# Patient Record
Sex: MALE | Race: WHITE | URBAN - METROPOLITAN AREA
[De-identification: names, ages, dates, MRNs, and addresses within clinical notes are randomized per-mention and may not be internally consistent; named-entity substitution may affect disease eponyms.]

---

## 2020-02-12 ENCOUNTER — EMERGENCY (EMERGENCY)
Facility: HOSPITAL | Age: 60
LOS: 0 days | Discharge: HOME | End: 2020-02-12
Attending: STUDENT IN AN ORGANIZED HEALTH CARE EDUCATION/TRAINING PROGRAM
Payer: COMMERCIAL

## 2020-02-12 VITALS
DIASTOLIC BLOOD PRESSURE: 93 MMHG | RESPIRATION RATE: 17 BRPM | HEIGHT: 75 IN | HEART RATE: 82 BPM | TEMPERATURE: 98 F | OXYGEN SATURATION: 95 % | SYSTOLIC BLOOD PRESSURE: 135 MMHG | WEIGHT: 300.05 LBS

## 2020-02-12 DIAGNOSIS — M25.552 PAIN IN LEFT HIP: ICD-10-CM
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
## 2020-02-12 DIAGNOSIS — M25.512 PAIN IN LEFT SHOULDER: ICD-10-CM

## 2020-02-12 DIAGNOSIS — Z85.46 PERSONAL HISTORY OF MALIGNANT NEOPLASM OF PROSTATE: ICD-10-CM
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
## 2020-02-12 DIAGNOSIS — S46.912A STRAIN OF UNSPECIFIED MUSCLE, FASCIA AND TENDON AT SHOULDER AND UPPER ARM LEVEL, LEFT ARM, INITIAL ENCOUNTER: ICD-10-CM

## 2020-02-12 DIAGNOSIS — Z88.1 ALLERGY STATUS TO OTHER ANTIBIOTIC AGENTS STATUS: ICD-10-CM

## 2020-02-12 PROCEDURE — 99283 EMERGENCY DEPT VISIT LOW MDM: CPT

## 2020-02-12 PROCEDURE — 73502 X-RAY EXAM HIP UNI 2-3 VIEWS: CPT | Mod: LT

## 2020-02-12 PROCEDURE — 73060 X-RAY EXAM OF HUMERUS: CPT | Mod: LT

## 2020-02-12 PROCEDURE — 73030 X-RAY EXAM OF SHOULDER: CPT | Mod: 26,LT

## 2020-02-12 PROCEDURE — 99284 EMERGENCY DEPT VISIT MOD MDM: CPT | Mod: 25

## 2020-02-12 PROCEDURE — 73030 X-RAY EXAM OF SHOULDER: CPT | Mod: LT

## 2020-02-12 PROCEDURE — 73502 X-RAY EXAM HIP UNI 2-3 VIEWS: CPT | Mod: 26,LT

## 2020-02-12 PROCEDURE — 73060 X-RAY EXAM OF HUMERUS: CPT | Mod: 26,LT

## 2020-02-12 RX ORDER — OXYCODONE AND ACETAMINOPHEN 5; 325 MG/1; MG/1
1 TABLET ORAL ONCE
Refills: 0 | Status: DISCONTINUED | OUTPATIENT
Start: 2020-02-12 | End: 2020-02-12

## 2020-02-12 RX ADMIN — OXYCODONE AND ACETAMINOPHEN 1 TABLET(S): 5; 325 TABLET ORAL at 19:08

## 2020-02-12 NOTE — ED ADULT TRIAGE NOTE - CHIEF COMPLAINT QUOTE
SLIPPED DOWN 5 STEPS, NO LOC, NO HEAD TRAUMA. PT HIT LEFT SHOULDER AND LEFT HIP COMPLAINTS OF 10/10 PAIN. AMBULATORY ON SCENE.

## 2020-02-12 NOTE — ED STATDOCS - ATTENDING CONTRIBUTION TO CARE
I, Chika Santana DO,  performed the initial face to face bedside interview with this patient regarding history of present illness, review of symptoms and relevant past medical, social and family history.  I completed an independent physical examination.  I was the initial provider who evaluated this patient. I have signed out the follow up of any pending tests (i.e. labs, radiological studies) to the ACP.  I have communicated the patient’s plan of care and disposition with the ACP.  The history, relevant review of systems, past medical and surgical history, medical decision making, and physical examination was documented by the scribe in my presence and I attest to the accuracy of the documentation.

## 2020-02-12 NOTE — ED STATDOCS - PROGRESS NOTE DETAILS
61 y/o M presents with shoulder pain s/p fall. pt states he slipped and fell forward down 5 stairs injuring his L hip and L shoulder. Pt reports difficulty moving L arm. Denies hitting head, loc, n/v, HA, visual changes, CP, SOB, abd pain or other complaints at this time  Ext: No obvious deformity. TTP L ant shoulder, TTP L hip. Dec ROM LUE secondary to Pain. FROM STEPHEN Teixeira PA-C Xray negative, sling applied, discussed pendulum exercises with pt to prevent frozen shoulder. Will FU with orthopedics. -Armani Teixeira PA-C

## 2020-02-12 NOTE — ED STATDOCS - CARE PROVIDER_API CALL
Daniel Juarez)  Orthopaedic Surgery  [address], [address]  [place] [address]  Phone: [phone]  Fax: [phone]  Follow Up Time: Urgent

## 2020-02-12 NOTE — ED STATDOCS - OBJECTIVE STATEMENT
59 y/o male with a PMHx of Prostate CA, rotator cuff, presents to the ED BIBEMS c/p L shoulder and L hip pain s/p [place]. No LOC. No head injury. Denies cp, sob. No other complaints at this time.

## 2020-02-12 NOTE — ED STATDOCS - MUSCULOSKELETAL, MLM
tenderness L lateral shoulder, restricted ROM secondary to pain, tenderness to L hip but ambulatory in ED with steady gait

## 2020-02-12 NOTE — ED STATDOCS - PATIENT PORTAL LINK FT
You can access the FollowMyHealth Patient Portal offered by [facility] by registering at the following website: [email or web address]. By joining [employer]’s FollowMyHealth portal, you will also be able to view your health information using other applications (apps) compatible with our system.

## 2020-12-18 ENCOUNTER — OUTPATIENT (OUTPATIENT)
Dept: OUTPATIENT SERVICES | Facility: HOSPITAL | Age: 60
LOS: 1 days | End: 2020-12-18
Payer: COMMERCIAL

## 2020-12-18 DIAGNOSIS — Z20.828 CONTACT WITH AND (SUSPECTED) EXPOSURE TO OTHER VIRAL COMMUNICABLE DISEASES: ICD-10-CM

## 2020-12-18 LAB — SARS-COV-2 RNA SPEC QL NAA+PROBE: SIGNIFICANT CHANGE UP

## 2020-12-18 PROCEDURE — U0003: CPT

## 2020-12-19 DIAGNOSIS — Z20.828 CONTACT WITH AND (SUSPECTED) EXPOSURE TO OTHER VIRAL COMMUNICABLE DISEASES: ICD-10-CM

## 2021-03-07 ENCOUNTER — OUTPATIENT (OUTPATIENT)
Dept: OUTPATIENT SERVICES | Facility: HOSPITAL | Age: 61
LOS: 1 days | End: 2021-03-07
Payer: COMMERCIAL

## 2021-03-07 DIAGNOSIS — Z20.828 CONTACT WITH AND (SUSPECTED) EXPOSURE TO OTHER VIRAL COMMUNICABLE DISEASES: ICD-10-CM

## 2021-03-07 LAB — SARS-COV-2 RNA SPEC QL NAA+PROBE: DETECTED

## 2021-03-07 PROCEDURE — U0005: CPT

## 2021-03-07 PROCEDURE — C9803: CPT

## 2021-03-07 PROCEDURE — U0003: CPT

## 2021-03-08 DIAGNOSIS — Z20.828 CONTACT WITH AND (SUSPECTED) EXPOSURE TO OTHER VIRAL COMMUNICABLE DISEASES: ICD-10-CM

## 2021-03-14 ENCOUNTER — OUTPATIENT (OUTPATIENT)
Dept: OUTPATIENT SERVICES | Facility: HOSPITAL | Age: 61
LOS: 1 days | End: 2021-03-14
Payer: COMMERCIAL

## 2021-03-14 DIAGNOSIS — Z20.828 CONTACT WITH AND (SUSPECTED) EXPOSURE TO OTHER VIRAL COMMUNICABLE DISEASES: ICD-10-CM

## 2021-03-14 LAB — SARS-COV-2 RNA SPEC QL NAA+PROBE: DETECTED

## 2021-03-14 PROCEDURE — U0003: CPT

## 2021-03-14 PROCEDURE — U0005: CPT

## 2021-03-14 PROCEDURE — C9803: CPT

## 2021-03-15 DIAGNOSIS — Z20.828 CONTACT WITH AND (SUSPECTED) EXPOSURE TO OTHER VIRAL COMMUNICABLE DISEASES: ICD-10-CM

## 2021-07-02 ENCOUNTER — EMERGENCY (EMERGENCY)
Facility: HOSPITAL | Age: 61
LOS: 0 days | Discharge: HOME | End: 2021-07-03
Attending: FAMILY MEDICINE
Payer: COMMERCIAL

## 2021-07-02 VITALS
SYSTOLIC BLOOD PRESSURE: 96 MMHG | OXYGEN SATURATION: 94 % | WEIGHT: 315 LBS | HEART RATE: 90 BPM | TEMPERATURE: 98 F | HEIGHT: 75 IN | RESPIRATION RATE: 17 BRPM | DIASTOLIC BLOOD PRESSURE: 66 MMHG

## 2021-07-02 DIAGNOSIS — Z85.46 PERSONAL HISTORY OF MALIGNANT NEOPLASM OF PROSTATE: ICD-10-CM

## 2021-07-02 DIAGNOSIS — S01.81XA LACERATION WITHOUT FOREIGN BODY OF OTHER PART OF HEAD, INITIAL ENCOUNTER: ICD-10-CM

## 2021-07-02 DIAGNOSIS — Z88.8 ALLERGY STATUS TO OTHER DRUGS, MEDICAMENTS AND BIOLOGICAL SUBSTANCES: ICD-10-CM

## 2021-07-02 DIAGNOSIS — Z23 ENCOUNTER FOR IMMUNIZATION: ICD-10-CM
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
## 2021-07-02 DIAGNOSIS — Z96.653 PRESENCE OF ARTIFICIAL KNEE JOINT, BILATERAL: Chronic | ICD-10-CM

## 2021-07-02 DIAGNOSIS — S80.211A ABRASION, RIGHT KNEE, INITIAL ENCOUNTER: ICD-10-CM

## 2021-07-02 DIAGNOSIS — S02.2XXA FRACTURE OF NASAL BONES, INITIAL ENCOUNTER FOR CLOSED FRACTURE: ICD-10-CM

## 2021-07-02 DIAGNOSIS — F10.129 ALCOHOL ABUSE WITH INTOXICATION, UNSPECIFIED: ICD-10-CM
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
## 2021-07-02 DIAGNOSIS — M19.90 UNSPECIFIED OSTEOARTHRITIS, UNSPECIFIED SITE: ICD-10-CM

## 2021-07-02 LAB
ALBUMIN SERPL ELPH-MCNC: 4 G/DL — SIGNIFICANT CHANGE UP (ref 3.3–5)
ALP SERPL-CCNC: 69 U/L — SIGNIFICANT CHANGE UP (ref 40–120)
ALT FLD-CCNC: 67 U/L — SIGNIFICANT CHANGE UP (ref 12–78)
ANION GAP SERPL CALC-SCNC: 11 MMOL/L — SIGNIFICANT CHANGE UP (ref 5–17)
AST SERPL-CCNC: 31 U/L — SIGNIFICANT CHANGE UP (ref 15–37)
BASOPHILS # BLD AUTO: 0.12 K/UL — SIGNIFICANT CHANGE UP (ref 0–0.2)
BASOPHILS NFR BLD AUTO: 0.8 % — SIGNIFICANT CHANGE UP (ref 0–2)
BILIRUB SERPL-MCNC: 0.7 MG/DL — SIGNIFICANT CHANGE UP (ref 0.2–1.2)
BUN SERPL-MCNC: 16 MG/DL — SIGNIFICANT CHANGE UP (ref 7–23)
CALCIUM SERPL-MCNC: 8.9 MG/DL — SIGNIFICANT CHANGE UP (ref 8.5–10.1)
CHLORIDE SERPL-SCNC: 105 MMOL/L — SIGNIFICANT CHANGE UP (ref 96–108)
CO2 SERPL-SCNC: 22 MMOL/L — SIGNIFICANT CHANGE UP (ref 22–31)
CREAT SERPL-MCNC: 1.33 MG/DL — HIGH (ref 0.5–1.3)
EOSINOPHIL # BLD AUTO: 0.3 K/UL — SIGNIFICANT CHANGE UP (ref 0–0.5)
EOSINOPHIL NFR BLD AUTO: 2.1 % — SIGNIFICANT CHANGE UP (ref 0–6)
ETHANOL SERPL-MCNC: 36 MG/DL — HIGH (ref 0–10)
GLUCOSE SERPL-MCNC: 136 MG/DL — HIGH (ref 70–99)
HCT VFR BLD CALC: 48.8 % — SIGNIFICANT CHANGE UP (ref 39–50)
HGB BLD-MCNC: 16.3 G/DL — SIGNIFICANT CHANGE UP (ref 13–17)
IMM GRANULOCYTES NFR BLD AUTO: 1 % — SIGNIFICANT CHANGE UP (ref 0–1.5)
LYMPHOCYTES # BLD AUTO: 24.8 % — SIGNIFICANT CHANGE UP (ref 13–44)
LYMPHOCYTES # BLD AUTO: 3.6 K/UL — HIGH (ref 1–3.3)
MCHC RBC-ENTMCNC: 32 PG — SIGNIFICANT CHANGE UP (ref 27–34)
MCHC RBC-ENTMCNC: 33.4 GM/DL — SIGNIFICANT CHANGE UP (ref 32–36)
MCV RBC AUTO: 95.7 FL — SIGNIFICANT CHANGE UP (ref 80–100)
MONOCYTES # BLD AUTO: 0.89 K/UL — SIGNIFICANT CHANGE UP (ref 0–0.9)
MONOCYTES NFR BLD AUTO: 6.1 % — SIGNIFICANT CHANGE UP (ref 2–14)
NEUTROPHILS # BLD AUTO: 9.48 K/UL — HIGH (ref 1.8–7.4)
NEUTROPHILS NFR BLD AUTO: 65.2 % — SIGNIFICANT CHANGE UP (ref 43–77)
PLATELET # BLD AUTO: 252 K/UL — SIGNIFICANT CHANGE UP (ref 150–400)
POTASSIUM SERPL-MCNC: 3.8 MMOL/L — SIGNIFICANT CHANGE UP (ref 3.5–5.3)
POTASSIUM SERPL-SCNC: 3.8 MMOL/L — SIGNIFICANT CHANGE UP (ref 3.5–5.3)
PROT SERPL-MCNC: 7.3 GM/DL — SIGNIFICANT CHANGE UP (ref 6–8.3)
RBC # BLD: 5.1 M/UL — SIGNIFICANT CHANGE UP (ref 4.2–5.8)
RBC # FLD: 13.7 % — SIGNIFICANT CHANGE UP (ref 10.3–14.5)
SODIUM SERPL-SCNC: 138 MMOL/L — SIGNIFICANT CHANGE UP (ref 135–145)
TROPONIN I SERPL-MCNC: <0.015 NG/ML — SIGNIFICANT CHANGE UP (ref 0.01–0.04)
WBC # BLD: 14.54 K/UL — HIGH (ref 3.8–10.5)
WBC # FLD AUTO: 14.54 K/UL — HIGH (ref 3.8–10.5)

## 2021-07-02 PROCEDURE — 72170 X-RAY EXAM OF PELVIS: CPT | Mod: 26

## 2021-07-02 PROCEDURE — 70450 CT HEAD/BRAIN W/O DYE: CPT | Mod: 26

## 2021-07-02 PROCEDURE — 99285 EMERGENCY DEPT VISIT HI MDM: CPT | Mod: 25

## 2021-07-02 PROCEDURE — 96374 THER/PROPH/DIAG INJ IV PUSH: CPT

## 2021-07-02 PROCEDURE — 76376 3D RENDER W/INTRP POSTPROCES: CPT

## 2021-07-02 PROCEDURE — 99285 EMERGENCY DEPT VISIT HI MDM: CPT

## 2021-07-02 PROCEDURE — 80307 DRUG TEST PRSMV CHEM ANLYZR: CPT

## 2021-07-02 PROCEDURE — 72125 CT NECK SPINE W/O DYE: CPT | Mod: 26

## 2021-07-02 PROCEDURE — 72125 CT NECK SPINE W/O DYE: CPT

## 2021-07-02 PROCEDURE — 70486 CT MAXILLOFACIAL W/O DYE: CPT | Mod: 26,MA

## 2021-07-02 PROCEDURE — 73562 X-RAY EXAM OF KNEE 3: CPT | Mod: 26,RT

## 2021-07-02 PROCEDURE — 36415 COLL VENOUS BLD VENIPUNCTURE: CPT

## 2021-07-02 PROCEDURE — 93005 ELECTROCARDIOGRAM TRACING: CPT

## 2021-07-02 PROCEDURE — 93010 ELECTROCARDIOGRAM REPORT: CPT

## 2021-07-02 PROCEDURE — 71045 X-RAY EXAM CHEST 1 VIEW: CPT | Mod: 26

## 2021-07-02 PROCEDURE — 85025 COMPLETE CBC W/AUTO DIFF WBC: CPT

## 2021-07-02 PROCEDURE — 70450 CT HEAD/BRAIN W/O DYE: CPT

## 2021-07-02 PROCEDURE — 71045 X-RAY EXAM CHEST 1 VIEW: CPT

## 2021-07-02 PROCEDURE — 73562 X-RAY EXAM OF KNEE 3: CPT | Mod: RT

## 2021-07-02 PROCEDURE — 84484 ASSAY OF TROPONIN QUANT: CPT

## 2021-07-02 PROCEDURE — 80053 COMPREHEN METABOLIC PANEL: CPT

## 2021-07-02 PROCEDURE — 90715 TDAP VACCINE 7 YRS/> IM: CPT

## 2021-07-02 PROCEDURE — 90471 IMMUNIZATION ADMIN: CPT

## 2021-07-02 PROCEDURE — 82962 GLUCOSE BLOOD TEST: CPT

## 2021-07-02 PROCEDURE — 76376 3D RENDER W/INTRP POSTPROCES: CPT | Mod: 26

## 2021-07-02 PROCEDURE — 70486 CT MAXILLOFACIAL W/O DYE: CPT

## 2021-07-02 PROCEDURE — 72170 X-RAY EXAM OF PELVIS: CPT

## 2021-07-02 RX ORDER — ACETAMINOPHEN 500 MG
1000 TABLET ORAL ONCE
Refills: 0 | Status: COMPLETED | OUTPATIENT
Start: 2021-07-02 | End: 2021-07-02

## 2021-07-02 RX ORDER — TETANUS TOXOID, REDUCED DIPHTHERIA TOXOID AND ACELLULAR PERTUSSIS VACCINE, ADSORBED 5; 2.5; 8; 8; 2.5 [IU]/.5ML; [IU]/.5ML; UG/.5ML; UG/.5ML; UG/.5ML
0.5 SUSPENSION INTRAMUSCULAR ONCE
Refills: 0 | Status: COMPLETED | OUTPATIENT
Start: 2021-07-02 | End: 2021-07-02

## 2021-07-02 RX ORDER — CEFAZOLIN SODIUM 1 G
1000 VIAL (EA) INJECTION ONCE
Refills: 0 | Status: DISCONTINUED | OUTPATIENT
Start: 2021-07-02 | End: 2021-07-02

## 2021-07-02 RX ORDER — CEFAZOLIN SODIUM 1 G
1000 VIAL (EA) INJECTION ONCE
Refills: 0 | Status: COMPLETED | OUTPATIENT
Start: 2021-07-02 | End: 2021-07-02

## 2021-07-02 RX ORDER — SODIUM CHLORIDE 9 MG/ML
1000 INJECTION INTRAMUSCULAR; INTRAVENOUS; SUBCUTANEOUS ONCE
Refills: 0 | Status: COMPLETED | OUTPATIENT
Start: 2021-07-02 | End: 2021-07-02

## 2021-07-02 RX ADMIN — TETANUS TOXOID, REDUCED DIPHTHERIA TOXOID AND ACELLULAR PERTUSSIS VACCINE, ADSORBED 0.5 MILLILITER(S): 5; 2.5; 8; 8; 2.5 SUSPENSION INTRAMUSCULAR at 20:23

## 2021-07-02 RX ADMIN — Medication 1000 MILLIGRAM(S): at 21:48

## 2021-07-02 RX ADMIN — SODIUM CHLORIDE 1000 MILLILITER(S): 9 INJECTION INTRAMUSCULAR; INTRAVENOUS; SUBCUTANEOUS at 18:59

## 2021-07-02 NOTE — ED PROVIDER NOTE - SKIN, MLM
Skin normal color for race, warm, dry and intact. No evidence of rash. Skin warm. No evidence of rash. Multiple abrasions to frontal area with active bleeding. Laceration approximately 1.5 cm to bridge of nose. Avulsion to tip of nose. Abrasion to R knee with tenderness. Able to flex.

## 2021-07-02 NOTE — ED PROVIDER NOTE - CLINICAL SUMMARY MEDICAL DECISION MAKING FREE TEXT BOX
Pt with hx of prostate CA (no treatment) here after trip and fall with facial injury. No LOC. Multiple facial injuries, neck pain. Trauma alert called. CT, plastics consult, labs, update tetanus.

## 2021-07-02 NOTE — ED PROVIDER NOTE - CARE PLAN
Principal Discharge DX:	Nasal fracture  Secondary Diagnosis:	Facial laceration, initial encounter  Secondary Diagnosis:	Fall, initial encounter

## 2021-07-02 NOTE — ED PROVIDER NOTE - ENMT, MLM
Airway patent, Nasal mucosa clear. Mouth with normal mucosa. Throat has no vesicles, no oropharyngeal exudates and uvula is midline. Airway patent, Nasal mucosa clear. Mouth with normal mucosa. Throat has no vesicles, no oropharyngeal exudates and uvula is midline. no septal hematoma palpated.

## 2021-07-02 NOTE — ED PROVIDER NOTE - CARE PROVIDER_API CALL
Shannon Islas)  Plastic Surgery  [address], [address]  [place] [address]  Phone: [phone]  Fax: [phone]  Follow Up Time:     Fredi Rizo)  Otolaryngology  [address] [place] [address]  Phone: [phone]  Fax: [phone]  Follow Up Time:

## 2021-07-02 NOTE — ED PROVIDER NOTE - PROGRESS NOTE DETAILS
Cristopher Field for attending Dr. Mcghee: Pt request plastics. Dr. Roshan tyler. Cristopher Field for attending Dr. Mcghee: Pt request plastics. Dr. Islas informed. Pt and spouse are aware that there may be an additional fee for plastic surgeon.

## 2021-07-02 NOTE — ED ADULT NURSE NOTE - OBJECTIVE STATEMENT
Patient comes in s/p trip and fall with laceration to nose and above left eyebrow as per EMS. Bleeding controlled  Patient endorses drinking 3 alcoholic beverages today. - LOC. TA called at 1940

## 2021-07-02 NOTE — ED ADULT TRIAGE NOTE - CHIEF COMPLAINT QUOTE
Patient comes in s/p trip and fall with laceration to nose and above left eyebrow as per EMS. Bleeding controlled at triage. Patient endorses drinking 3 alcoholic beverages today. - LOC. TA called at 1940

## 2021-07-02 NOTE — ED PROVIDER NOTE - PATIENT PORTAL LINK FT
You can access the FollowMyHealth Patient Portal offered by [facility] by registering at the following website: [email or web address]. By joining [employer]’s FollowMyHealth portal, you will also be able to view your health information using other applications (apps) compatible with our system.

## 2021-07-02 NOTE — ED PROVIDER NOTE - OBJECTIVE STATEMENT
62 y/o male with PMHx of prostate CA, s/p b/l TKRs, OA presents to ED s/p mechanical fall at about 5:45 pm. Pt endorses drinking 3 alcoholic beverages (gin and tonic) today. Pt was walking on the sidewalk, tripped, and fell. +Head injury. C/o left sided neck pain, right knee pain, lacerations to nose, above left eyebrow. Denies LOC, CP, abdominal pain, hip pain. Was not able to get up on his own. Denies cardiac hx. PMD: Dr. Torres. 60 y/o male with PMHx of prostate CA, s/p b/l TKRs, OA presents to ED s/p mechanical fall at about 5:45 pm. Pt endorses drinking 3 alcoholic beverages (gin and tonic) today. Pt was walking on the sidewalk, tripped, and fell. +Head injury. C/o left sided neck pain, right knee pain, lacerations to nose, above left eyebrow. Denies LOC, CP, abdominal pain, hip pain. Was not able to get up on his own. Denies cardiac hx. Non smoker, no illegal drug use. PMD: Dr. Torres. 60 y/o male with PMHx of prostate CA, s/p b/l TKRs, OA presents to ED s/p mechanical fall at about 5:45 pm. Pt endorses drinking 3 alcoholic beverages (gin and tonic) today. Pt was walking on the sidewalk, tripped, and fell. +Head injury. C/o left sided neck pain, right knee pain, lacerations to nose, above left eyebrow. Denies LOC, CP, abdominal pain, hip pain. Was not able to get up on his own. Unsure of last tetanus. Denies cardiac hx. Non smoker, no illegal drug use. PMD: Dr. Torres.

## 2021-07-02 NOTE — ED PROVIDER NOTE - NSFOLLOWUPINSTRUCTIONS_ED_ALL_ED_FT
Keep wounds clean and dry. Take tylenol every four hours for discomfort. After 24 hours may wash gently with soap and water, rinse thoroughly, dry with clean towel, apply bacitracin and dress. Follow up with Dr. Islas as directed. Follow up with ent Dr. Rizo regarding  your nose.

## 2021-07-02 NOTE — ED PROVIDER NOTE - MUSCULOSKELETAL, MLM
Spine appears normal, range of motion is not limited, no muscle or joint tenderness Spine appears normal, range of motion is not limited, no muscle or joint tenderness. HAZEL x4

## 2021-07-03 VITALS
DIASTOLIC BLOOD PRESSURE: 73 MMHG | OXYGEN SATURATION: 94 % | HEART RATE: 95 BPM | RESPIRATION RATE: 18 BRPM | SYSTOLIC BLOOD PRESSURE: 109 MMHG | TEMPERATURE: 98 F

## 2021-07-03 RX ADMIN — Medication 1000 MILLIGRAM(S): at 00:08

## 2022-04-26 ENCOUNTER — EMERGENCY (EMERGENCY)
Facility: HOSPITAL | Age: 62
LOS: 0 days | Discharge: HOME | End: 2022-04-26
Attending: EMERGENCY MEDICINE
Payer: COMMERCIAL

## 2022-04-26 VITALS — HEIGHT: 75 IN

## 2022-04-26 VITALS
RESPIRATION RATE: 18 BRPM | SYSTOLIC BLOOD PRESSURE: 124 MMHG | OXYGEN SATURATION: 98 % | TEMPERATURE: 98 F | DIASTOLIC BLOOD PRESSURE: 68 MMHG | HEART RATE: 97 BPM

## 2022-04-26 DIAGNOSIS — M25.511 PAIN IN RIGHT SHOULDER: ICD-10-CM

## 2022-04-26 DIAGNOSIS — M54.2 CERVICALGIA: ICD-10-CM

## 2022-04-26 DIAGNOSIS — Z88.1 ALLERGY STATUS TO OTHER ANTIBIOTIC AGENTS STATUS: ICD-10-CM

## 2022-04-26 DIAGNOSIS — S09.90XA UNSPECIFIED INJURY OF HEAD, INITIAL ENCOUNTER: ICD-10-CM

## 2022-04-26 DIAGNOSIS — Z92.3 PERSONAL HISTORY OF IRRADIATION: ICD-10-CM

## 2022-04-26 DIAGNOSIS — Z96.653 PRESENCE OF ARTIFICIAL KNEE JOINT, BILATERAL: ICD-10-CM
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
## 2022-04-26 DIAGNOSIS — S52.121A DISPLACED FRACTURE OF HEAD OF RIGHT RADIUS, INITIAL ENCOUNTER FOR CLOSED FRACTURE: ICD-10-CM
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
## 2022-04-26 DIAGNOSIS — Z85.46 PERSONAL HISTORY OF MALIGNANT NEOPLASM OF PROSTATE: ICD-10-CM

## 2022-04-26 DIAGNOSIS — M54.50 LOW BACK PAIN, UNSPECIFIED: ICD-10-CM

## 2022-04-26 DIAGNOSIS — Z20.822 CONTACT WITH AND (SUSPECTED) EXPOSURE TO COVID-19: ICD-10-CM

## 2022-04-26 DIAGNOSIS — M25.521 PAIN IN RIGHT ELBOW: ICD-10-CM

## 2022-04-26 DIAGNOSIS — S46.911A STRAIN OF UNSPECIFIED MUSCLE, FASCIA AND TENDON AT SHOULDER AND UPPER ARM LEVEL, RIGHT ARM, INITIAL ENCOUNTER: ICD-10-CM

## 2022-04-26 DIAGNOSIS — Z96.653 PRESENCE OF ARTIFICIAL KNEE JOINT, BILATERAL: Chronic | ICD-10-CM

## 2022-04-26 DIAGNOSIS — S51.011A LACERATION WITHOUT FOREIGN BODY OF RIGHT ELBOW, INITIAL ENCOUNTER: ICD-10-CM

## 2022-04-26 LAB
ALBUMIN SERPL ELPH-MCNC: 3.4 G/DL — SIGNIFICANT CHANGE UP (ref 3.3–5)
ALP SERPL-CCNC: 96 U/L — SIGNIFICANT CHANGE UP (ref 40–120)
ALT FLD-CCNC: 81 U/L — HIGH (ref 12–78)
ANION GAP SERPL CALC-SCNC: 9 MMOL/L — SIGNIFICANT CHANGE UP (ref 5–17)
AST SERPL-CCNC: 138 U/L — HIGH (ref 15–37)
BASOPHILS # BLD AUTO: 0.09 K/UL — SIGNIFICANT CHANGE UP (ref 0–0.2)
BASOPHILS NFR BLD AUTO: 0.4 % — SIGNIFICANT CHANGE UP (ref 0–2)
BILIRUB SERPL-MCNC: 1.9 MG/DL — HIGH (ref 0.2–1.2)
BUN SERPL-MCNC: 13 MG/DL — SIGNIFICANT CHANGE UP (ref 7–23)
CALCIUM SERPL-MCNC: 9 MG/DL — SIGNIFICANT CHANGE UP (ref 8.5–10.1)
CHLORIDE SERPL-SCNC: 98 MMOL/L — SIGNIFICANT CHANGE UP (ref 96–108)
CO2 SERPL-SCNC: 25 MMOL/L — SIGNIFICANT CHANGE UP (ref 22–31)
CREAT SERPL-MCNC: 0.94 MG/DL — SIGNIFICANT CHANGE UP (ref 0.5–1.3)
EGFR: 91 ML/MIN/1.73M2 — SIGNIFICANT CHANGE UP
EOSINOPHIL # BLD AUTO: 0.17 K/UL — SIGNIFICANT CHANGE UP (ref 0–0.5)
EOSINOPHIL NFR BLD AUTO: 0.7 % — SIGNIFICANT CHANGE UP (ref 0–6)
GLUCOSE SERPL-MCNC: 97 MG/DL — SIGNIFICANT CHANGE UP (ref 70–99)
HCT VFR BLD CALC: 44.3 % — SIGNIFICANT CHANGE UP (ref 39–50)
HGB BLD-MCNC: 15.1 G/DL — SIGNIFICANT CHANGE UP (ref 13–17)
IMM GRANULOCYTES NFR BLD AUTO: 0.9 % — SIGNIFICANT CHANGE UP (ref 0–1.5)
LYMPHOCYTES # BLD AUTO: 0.84 K/UL — LOW (ref 1–3.3)
LYMPHOCYTES # BLD AUTO: 3.7 % — LOW (ref 13–44)
MCHC RBC-ENTMCNC: 32.4 PG — SIGNIFICANT CHANGE UP (ref 27–34)
MCHC RBC-ENTMCNC: 34.1 GM/DL — SIGNIFICANT CHANGE UP (ref 32–36)
MCV RBC AUTO: 95.1 FL — SIGNIFICANT CHANGE UP (ref 80–100)
MONOCYTES # BLD AUTO: 1.57 K/UL — HIGH (ref 0–0.9)
MONOCYTES NFR BLD AUTO: 6.9 % — SIGNIFICANT CHANGE UP (ref 2–14)
NEUTROPHILS # BLD AUTO: 20.02 K/UL — HIGH (ref 1.8–7.4)
NEUTROPHILS NFR BLD AUTO: 87.4 % — HIGH (ref 43–77)
PLATELET # BLD AUTO: 179 K/UL — SIGNIFICANT CHANGE UP (ref 150–400)
POTASSIUM SERPL-MCNC: 3.6 MMOL/L — SIGNIFICANT CHANGE UP (ref 3.5–5.3)
POTASSIUM SERPL-SCNC: 3.6 MMOL/L — SIGNIFICANT CHANGE UP (ref 3.5–5.3)
PROT SERPL-MCNC: 7.9 GM/DL — SIGNIFICANT CHANGE UP (ref 6–8.3)
RBC # BLD: 4.66 M/UL — SIGNIFICANT CHANGE UP (ref 4.2–5.8)
RBC # FLD: 13.9 % — SIGNIFICANT CHANGE UP (ref 10.3–14.5)
SODIUM SERPL-SCNC: 132 MMOL/L — LOW (ref 135–145)
WBC # BLD: 22.89 K/UL — HIGH (ref 3.8–10.5)
WBC # FLD AUTO: 22.89 K/UL — HIGH (ref 3.8–10.5)

## 2022-04-26 PROCEDURE — 70450 CT HEAD/BRAIN W/O DYE: CPT | Mod: MA

## 2022-04-26 PROCEDURE — 24650 CLTX RDL HEAD/NCK FX WO MNPJ: CPT | Mod: 54

## 2022-04-26 PROCEDURE — 73080 X-RAY EXAM OF ELBOW: CPT | Mod: RT

## 2022-04-26 PROCEDURE — U0005: CPT

## 2022-04-26 PROCEDURE — 85025 COMPLETE CBC W/AUTO DIFF WBC: CPT

## 2022-04-26 PROCEDURE — 36415 COLL VENOUS BLD VENIPUNCTURE: CPT

## 2022-04-26 PROCEDURE — 99285 EMERGENCY DEPT VISIT HI MDM: CPT | Mod: 57

## 2022-04-26 PROCEDURE — 70450 CT HEAD/BRAIN W/O DYE: CPT | Mod: 26,MA

## 2022-04-26 PROCEDURE — U0003: CPT

## 2022-04-26 PROCEDURE — 96374 THER/PROPH/DIAG INJ IV PUSH: CPT | Mod: XU

## 2022-04-26 PROCEDURE — 72125 CT NECK SPINE W/O DYE: CPT | Mod: MA

## 2022-04-26 PROCEDURE — 73030 X-RAY EXAM OF SHOULDER: CPT | Mod: RT

## 2022-04-26 PROCEDURE — 72100 X-RAY EXAM L-S SPINE 2/3 VWS: CPT

## 2022-04-26 PROCEDURE — 73030 X-RAY EXAM OF SHOULDER: CPT | Mod: 26,RT

## 2022-04-26 PROCEDURE — 72100 X-RAY EXAM L-S SPINE 2/3 VWS: CPT | Mod: 26

## 2022-04-26 PROCEDURE — 80053 COMPREHEN METABOLIC PANEL: CPT

## 2022-04-26 PROCEDURE — 96375 TX/PRO/DX INJ NEW DRUG ADDON: CPT | Mod: XU

## 2022-04-26 PROCEDURE — 73521 X-RAY EXAM HIPS BI 2 VIEWS: CPT

## 2022-04-26 PROCEDURE — 29105 APPLICATION LONG ARM SPLINT: CPT | Mod: RT

## 2022-04-26 PROCEDURE — 72125 CT NECK SPINE W/O DYE: CPT | Mod: 26,MA

## 2022-04-26 PROCEDURE — 73521 X-RAY EXAM HIPS BI 2 VIEWS: CPT | Mod: 26

## 2022-04-26 PROCEDURE — 99285 EMERGENCY DEPT VISIT HI MDM: CPT | Mod: 25

## 2022-04-26 PROCEDURE — 73080 X-RAY EXAM OF ELBOW: CPT | Mod: 26,RT

## 2022-04-26 RX ORDER — SODIUM CHLORIDE 9 MG/ML
1000 INJECTION INTRAMUSCULAR; INTRAVENOUS; SUBCUTANEOUS ONCE
Refills: 0 | Status: COMPLETED | OUTPATIENT
Start: 2022-04-26 | End: 2022-04-26

## 2022-04-26 RX ORDER — KETOROLAC TROMETHAMINE 30 MG/ML
15 SYRINGE (ML) INJECTION ONCE
Refills: 0 | Status: DISCONTINUED | OUTPATIENT
Start: 2022-04-26 | End: 2022-04-26

## 2022-04-26 RX ORDER — MORPHINE SULFATE 50 MG/1
6 CAPSULE, EXTENDED RELEASE ORAL ONCE
Refills: 0 | Status: DISCONTINUED | OUTPATIENT
Start: 2022-04-26 | End: 2022-04-26

## 2022-04-26 RX ADMIN — MORPHINE SULFATE 6 MILLIGRAM(S): 50 CAPSULE, EXTENDED RELEASE ORAL at 20:57

## 2022-04-26 RX ADMIN — SODIUM CHLORIDE 1000 MILLILITER(S): 9 INJECTION INTRAMUSCULAR; INTRAVENOUS; SUBCUTANEOUS at 23:05

## 2022-04-26 RX ADMIN — Medication 15 MILLIGRAM(S): at 20:57

## 2022-04-26 NOTE — ED ADULT NURSE NOTE - NSIMPLEMENTINTERV_GEN_ALL_ED
Implemented All Fall Risk Interventions:  Blair to call system. Call bell, personal items and telephone within reach. Instruct patient to call for assistance. Room bathroom lighting operational. Non-slip footwear when patient is off stretcher. Physically safe environment: no spills, clutter or unnecessary equipment. Stretcher in lowest position, wheels locked, appropriate side rails in place. Provide visual cue, wrist band, yellow gown, etc. Monitor gait and stability. Monitor for mental status changes and reorient to person, place, and time. Review medications for side effects contributing to fall risk. Reinforce activity limits and safety measures with patient and family.

## 2022-04-26 NOTE — ED ADULT TRIAGE NOTE - CHIEF COMPLAINT QUOTE
Pt presents to er with complaints of falling down 7 steps at 13:00 today, states his back, back of his head and laceration to right elbow, denies loc, use of blood thinners at this time, denies chest pain, sob, states he has a very mild headache at this time, complains of chronic bilateral hip pain at this time, unknown if utd with tetanus shot Pt presents to er with complaints of falling down 7 steps at 13:00 today, states he hit his back, back of his head and laceration to right elbow, pain to right shoulder/arm denies loc, use of blood thinners at this time, denies chest pain, sob, states he has a headache at this time, complains of chronic bilateral hip pain at this time, unknown if utd with tetanus shot.  TA called at 1818

## 2022-04-26 NOTE — ED PROVIDER NOTE - CARE PROVIDER_API CALL
Boston Allen)  Orthopaedic Surgery; Surgery of the Hand  [address] [place] [address]  Phone: [phone]  Fax: [phone]  Follow Up Time:

## 2022-04-26 NOTE — ED PROVIDER NOTE - NSFOLLOWUPINSTRUCTIONS_ED_ALL_ED_FT
Follow up with Dr Gee tomorrow  Alternatively you can follow up with Dr Allen for your elbow and shoulder.  plenty of rest  plenty of fluids  continue your medications as prescribed.  keep splint clean and dry, maintain at all times, do not remove until you see orthopedics  Anything worsens or persists, return to ER for further care and evaluation.      Fracture    A fracture is a break in one of your bones. This can occur from a variety of injuries, especially traumatic ones. Symptoms include pain, bruising, or swelling. Do not use the injured limb. If a fracture is in one of the bones below your waist, do not put weight on that limb unless instructed to do so by your healthcare provider. Crutches or a cane may have been provided. A splint or cast may have been applied by your health care provider. Make sure to keep it dry and follow up with an orthopedist as instructed.    SEEK IMMEDIATE MEDICAL CARE IF YOU HAVE ANY OF THE FOLLOWING SYMPTOMS: numbness, tingling, increasing pain, or weakness in any part of the injured limb.

## 2022-04-26 NOTE — ED PROVIDER NOTE - PATIENT PORTAL LINK FT
You can access the FollowMyHealth Patient Portal offered by [facility] by registering at the following website: [email or web address]. By joining [employer]’s FollowMyHealth portal, you will also be able to view your health information using other applications (apps) compatible with our system.

## 2022-04-26 NOTE — ED PROVIDER NOTE - CARE PLAN
1 Principal Discharge DX:	Head injury  Secondary Diagnosis:	Radial head fracture, closed  Secondary Diagnosis:	Right shoulder strain  Secondary Diagnosis:	Fall

## 2022-04-26 NOTE — ED PROVIDER NOTE - OBJECTIVE STATEMENT
63 y/o male w/ a PMHx of prostate CA presents to the ED s/p fall down 7 steps today at 1 pm. Pt reports he was walking up a flight of stairs when he fell backwards down 7 stairs on to a landing and hit his right elbow on the railing,  denies LOC. Pt c/o right shoulder/arm pain, chronic b/l hip pain, and neck pain on arriaval. Laceration noted to right elbow. Denies weakness, CP or SOB. Pt denies being on blood thinners. +trauma alert called in triage. Pt denies feeling ill in any other way. No other complaints at this time.

## 2022-04-26 NOTE — ED ADULT TRIAGE NOTE - BRAND OF FIRST COVID-19 BOOSTER
Physical Therapy  CVICU Treatment    Visit Type: treatment  Precautions:  Medical precautions:  sternal;. Multivessel coronary artery disease S/P CABG x 3  Lines:     Basic: continuous pulse oximetry, telemetry, O2, wound vac and IV (3L/min O2)      Lines in chart and on patient reviewed, cautions maintained throughout session.  Safety Measures: chair alarm      SUBJECTIVE                                                                                                            Patient agreed to participate in therapy this date.  Patient complains of being tired, otherwise doing OK. Needs to use the commode after walking.   No reports of pain during session       OBJECTIVE                                                                                                                Oriented to person, place, time and situation     Arousal alertness: appropriate responses to stimuli  Patient activity tolerance: 1 to 1 activity to rest  Bed Mobility:    Training completed:      Education details: patient safety    Discussed home sleeping arrangements, patient plans to alternate between sofa and recliner. Discussed sternal precautions  Transfers:    Assistive devices: gait belt and 1 person    Sit to stand: minimal assist    Stand to sit: minimal assist    Stand pivot: minimal assist  Gait/Ambulation:     Assistance: supervision   Assistive device: wheelchair, 1 person and gait belt    Distance (ft): 400    Pattern: within functional limits    Deviation in foot placement: wide base of support  Training Completed:    Tasks: gait training on level surfaces    Education details: patient safety    2 brief standing rest breaks due to ARANA, on 3L/min O2. SpO2 stable.         Interventions                                                                                                       Skilled input: Verbal instruction/cues  Verbal Consent: Writer verbally educated and received verbal consent for hand placement,  pts blood sugar this morning was 448.  They asked how long she is under our care from receiving letter. Advised 30 days. So we have her under our care until 7/4/19.  They are wondering since her blood sugar was so high this morning if they should give her more insulin. They did give her the scheduled insulin of novolog 20 units. Or should they just check her as scheduled at noon?    I can call Mckayla back at [phone]   positioning of patient, and techniques to be performed today from patient for clothing adjustments for techniques, therapist position for techniques and hand placement and palpation for techniques as described above and how they are pertinent to the patient's plan of care.  Additional Intervention Details: Education on deep breathing and splinted cough, patient able to mobilize and expel secretions x 1. Extra time for toileting during session.         ASSESSMENT                                                                                                                Impairments: activity tolerance, endurance, shortness of breath and body habitus  Functional Limitations: all functional mobility  Progressing well. Overall supervision to min assist for mobility, on 3L/min O2 for activity. Will benefit from continued skilled PT to address deficits.        Discharge Recommendations  Recommendation for Discharge: PT WI: Home, Home therapy         PT/OT Mobility Equipment for Discharge: continue to assess, owns 4WW and cane   PT/OT ADL Equipment for Discharge: has shower chair, grab bars - continue to assess  PT Identified Barriers to Discharge: medical     Skilled therapy is required to address these limitations in attempt to maximize the patient's independence.  Progress: improving as expected    End of Session:   Location: in bathroom  Handoff to: nurse            PLAN                                                                                                                            Suggestions for next session as indicated: Review bed mobility sequencing, transfers, progress gait to no AD    Frequency Comments: M-F      Agreement to plan and goals: patient agrees with goals and treatment plan        GOALS:  Review Date: 10/26/2020  Long Term Goals: (to be met by time of discharge from hospital)  State precautions: Patient able to state precautions independent.  Status: progressing/ongoing  Maintain  precautions: Patient able to maintain precautions independent.  Status: progressing/ongoing  Rolling left: Patient will complete bed mobility for rolling left modified independent.  Status: progressing/ongoing  Rolling right: Patient will complete bed mobility for rolling right modified independent.  Status: progressing/ongoing  Sit to supine: Patient will complete sit to supine modified independent.  Status: progressing/ongoing  Supine to sit: Patient will complete supine to sit modified independent.  Status: progressing/ongoing  Sit to stand: Patient will complete sit to stand transfer with modified independent.  Status: progressing/ongoing  Stand to sit: Patient will complete stand to sit transfer with modified independent.  Status: progressing/ongoing  Stand pivot: Patient will complete stand pivot transfer with modified independent.  Status: progressing/ongoing  Ambulation (even): Patient will ambulate on even surface for 150 feet with modified independent.  Status: progressing/ongoing  3-4 step device: to enter home.  Flight of stairs: Patient will ambulate a flight of stairs with modified independent. Status: not met    Documented in the chart in the following areas: Assessment.         [employer]

## 2022-04-26 NOTE — ED PROVIDER NOTE - PROGRESS NOTE DETAILS
Cristopher Matt for attending Dr. Fernandez:  Pt now c/o b/l shoulder and lower back pain. Pt states now he has chronic back and hip for which he take Oxycodone PO at home. Will order labs, further XRs, and pain meds. XRs sig for small radial head fracture.  splinted.  elbow lac superficial except for sub-cm part.  given must splint, lac steri-stripped so there are not sutures under a cast.  pt ambulating.  ok for dc home with ortho followup.

## 2022-04-27 PROBLEM — C61 MALIGNANT NEOPLASM OF PROSTATE: Chronic | Status: ACTIVE | Noted: 2021-07-09

## 2022-04-27 LAB — SARS-COV-2 RNA SPEC QL NAA+PROBE: SIGNIFICANT CHANGE UP

## 2022-04-30 ENCOUNTER — INPATIENT (INPATIENT)
Facility: HOSPITAL | Age: 62
LOS: 4 days | Discharge: HOME HEALTH | DRG: 871 | End: 2022-05-05
Attending: FAMILY MEDICINE | Admitting: FAMILY MEDICINE
Payer: COMMERCIAL

## 2022-04-30 VITALS — HEIGHT: 75 IN | WEIGHT: 291.89 LBS

## 2022-04-30 DIAGNOSIS — Z96.653 PRESENCE OF ARTIFICIAL KNEE JOINT, BILATERAL: Chronic | ICD-10-CM

## 2022-04-30 DIAGNOSIS — R09.89 OTHER SPECIFIED SYMPTOMS AND SIGNS INVOLVING THE CIRCULATORY AND RESPIRATORY SYSTEMS: ICD-10-CM

## 2022-04-30 DIAGNOSIS — J18.9 PNEUMONIA, UNSPECIFIED ORGANISM: ICD-10-CM

## 2022-04-30 LAB
ADD ON TEST-SPECIMEN IN LAB: SIGNIFICANT CHANGE UP
ALBUMIN SERPL ELPH-MCNC: 2.6 G/DL — LOW (ref 3.3–5)
ALP SERPL-CCNC: 94 U/L — SIGNIFICANT CHANGE UP (ref 40–120)
ALT FLD-CCNC: 84 U/L — HIGH (ref 12–78)
ANION GAP SERPL CALC-SCNC: 9 MMOL/L — SIGNIFICANT CHANGE UP (ref 5–17)
APTT BLD: 23.3 SEC — LOW (ref 27.5–35.5)
AST SERPL-CCNC: 56 U/L — HIGH (ref 15–37)
BASOPHILS # BLD AUTO: 0 K/UL — SIGNIFICANT CHANGE UP (ref 0–0.2)
BASOPHILS NFR BLD AUTO: 0 % — SIGNIFICANT CHANGE UP (ref 0–2)
BILIRUB SERPL-MCNC: 0.9 MG/DL — SIGNIFICANT CHANGE UP (ref 0.2–1.2)
BUN SERPL-MCNC: 18 MG/DL — SIGNIFICANT CHANGE UP (ref 7–23)
CALCIUM SERPL-MCNC: 8.2 MG/DL — LOW (ref 8.5–10.1)
CHLORIDE SERPL-SCNC: 98 MMOL/L — SIGNIFICANT CHANGE UP (ref 96–108)
CO2 SERPL-SCNC: 27 MMOL/L — SIGNIFICANT CHANGE UP (ref 22–31)
CREAT SERPL-MCNC: 0.97 MG/DL — SIGNIFICANT CHANGE UP (ref 0.5–1.3)
EGFR: 88 ML/MIN/1.73M2 — SIGNIFICANT CHANGE UP
EOSINOPHIL # BLD AUTO: 0.41 K/UL — SIGNIFICANT CHANGE UP (ref 0–0.5)
EOSINOPHIL NFR BLD AUTO: 2 % — SIGNIFICANT CHANGE UP (ref 0–6)
GLUCOSE SERPL-MCNC: 96 MG/DL — SIGNIFICANT CHANGE UP (ref 70–99)
HCT VFR BLD CALC: 44.3 % — SIGNIFICANT CHANGE UP (ref 39–50)
HGB BLD-MCNC: 14.8 G/DL — SIGNIFICANT CHANGE UP (ref 13–17)
INR BLD: 1.21 RATIO — HIGH (ref 0.88–1.16)
LACTATE SERPL-SCNC: 1.4 MMOL/L — SIGNIFICANT CHANGE UP (ref 0.7–2)
LYMPHOCYTES # BLD AUTO: 15 % — SIGNIFICANT CHANGE UP (ref 13–44)
LYMPHOCYTES # BLD AUTO: 3.05 K/UL — SIGNIFICANT CHANGE UP (ref 1–3.3)
MAGNESIUM SERPL-MCNC: 2.6 MG/DL — SIGNIFICANT CHANGE UP (ref 1.6–2.6)
MCHC RBC-ENTMCNC: 32 PG — SIGNIFICANT CHANGE UP (ref 27–34)
MCHC RBC-ENTMCNC: 33.4 GM/DL — SIGNIFICANT CHANGE UP (ref 32–36)
MCV RBC AUTO: 95.7 FL — SIGNIFICANT CHANGE UP (ref 80–100)
MONOCYTES # BLD AUTO: 1.63 K/UL — HIGH (ref 0–0.9)
MONOCYTES NFR BLD AUTO: 8 % — SIGNIFICANT CHANGE UP (ref 2–14)
NEUTROPHILS # BLD AUTO: 15.26 K/UL — HIGH (ref 1.8–7.4)
NEUTROPHILS NFR BLD AUTO: 74 % — SIGNIFICANT CHANGE UP (ref 43–77)
NRBC # BLD: SIGNIFICANT CHANGE UP /100 WBCS (ref 0–0)
NT-PROBNP SERPL-SCNC: 122 PG/ML — SIGNIFICANT CHANGE UP (ref 0–125)
PLATELET # BLD AUTO: SIGNIFICANT CHANGE UP K/UL (ref 150–400)
POTASSIUM SERPL-MCNC: 3.5 MMOL/L — SIGNIFICANT CHANGE UP (ref 3.5–5.3)
POTASSIUM SERPL-SCNC: 3.5 MMOL/L — SIGNIFICANT CHANGE UP (ref 3.5–5.3)
PROT SERPL-MCNC: 8 GM/DL — SIGNIFICANT CHANGE UP (ref 6–8.3)
PROTHROM AB SERPL-ACNC: 14.1 SEC — HIGH (ref 10.5–13.4)
RAPID RVP RESULT: SIGNIFICANT CHANGE UP
RBC # BLD: 4.63 M/UL — SIGNIFICANT CHANGE UP (ref 4.2–5.8)
RBC # FLD: 13.9 % — SIGNIFICANT CHANGE UP (ref 10.3–14.5)
SARS-COV-2 RNA SPEC QL NAA+PROBE: SIGNIFICANT CHANGE UP
SODIUM SERPL-SCNC: 134 MMOL/L — LOW (ref 135–145)
TROPONIN I, HIGH SENSITIVITY RESULT: 3.1 NG/L — SIGNIFICANT CHANGE UP
TROPONIN I, HIGH SENSITIVITY RESULT: <3 NG/L — SIGNIFICANT CHANGE UP
WBC # BLD: 20.35 K/UL — HIGH (ref 3.8–10.5)
WBC # FLD AUTO: 20.35 K/UL — HIGH (ref 3.8–10.5)

## 2022-04-30 PROCEDURE — 80053 COMPREHEN METABOLIC PANEL: CPT

## 2022-04-30 PROCEDURE — 88172 CYTP DX EVAL FNA 1ST EA SITE: CPT

## 2022-04-30 PROCEDURE — 71045 X-RAY EXAM CHEST 1 VIEW: CPT | Mod: 26

## 2022-04-30 PROCEDURE — 71275 CT ANGIOGRAPHY CHEST: CPT | Mod: 26

## 2022-04-30 PROCEDURE — 93010 ELECTROCARDIOGRAM REPORT: CPT

## 2022-04-30 PROCEDURE — 74177 CT ABD & PELVIS W/CONTRAST: CPT

## 2022-04-30 PROCEDURE — 38505 NEEDLE BIOPSY LYMPH NODES: CPT

## 2022-04-30 PROCEDURE — 93970 EXTREMITY STUDY: CPT

## 2022-04-30 PROCEDURE — 85027 COMPLETE CBC AUTOMATED: CPT

## 2022-04-30 PROCEDURE — 86803 HEPATITIS C AB TEST: CPT

## 2022-04-30 PROCEDURE — 85730 THROMBOPLASTIN TIME PARTIAL: CPT

## 2022-04-30 PROCEDURE — 71275 CT ANGIOGRAPHY CHEST: CPT | Mod: MD

## 2022-04-30 PROCEDURE — 94761 N-INVAS EAR/PLS OXIMETRY MLT: CPT

## 2022-04-30 PROCEDURE — 36415 COLL VENOUS BLD VENIPUNCTURE: CPT

## 2022-04-30 PROCEDURE — 85049 AUTOMATED PLATELET COUNT: CPT

## 2022-04-30 PROCEDURE — 76942 ECHO GUIDE FOR BIOPSY: CPT

## 2022-04-30 PROCEDURE — G0103: CPT

## 2022-04-30 PROCEDURE — 99285 EMERGENCY DEPT VISIT HI MDM: CPT

## 2022-04-30 PROCEDURE — 99223 1ST HOSP IP/OBS HIGH 75: CPT | Mod: GC

## 2022-04-30 PROCEDURE — 93306 TTE W/DOPPLER COMPLETE: CPT

## 2022-04-30 PROCEDURE — 88307 TISSUE EXAM BY PATHOLOGIST: CPT

## 2022-04-30 PROCEDURE — 84484 ASSAY OF TROPONIN QUANT: CPT

## 2022-04-30 PROCEDURE — 82378 CARCINOEMBRYONIC ANTIGEN: CPT

## 2022-04-30 PROCEDURE — 82306 VITAMIN D 25 HYDROXY: CPT

## 2022-04-30 PROCEDURE — 81001 URINALYSIS AUTO W/SCOPE: CPT

## 2022-04-30 RX ORDER — HEPARIN SODIUM 5000 [USP'U]/ML
INJECTION INTRAVENOUS; SUBCUTANEOUS
Qty: 25000 | Refills: 0 | Status: DISCONTINUED | OUTPATIENT
Start: 2022-04-30 | End: 2022-05-01

## 2022-04-30 RX ORDER — HEPARIN SODIUM 5000 [USP'U]/ML
5000 INJECTION INTRAVENOUS; SUBCUTANEOUS EVERY 6 HOURS
Refills: 0 | Status: DISCONTINUED | OUTPATIENT
Start: 2022-04-30 | End: 2022-05-01

## 2022-04-30 RX ORDER — CEFTRIAXONE 500 MG/1
1000 INJECTION, POWDER, FOR SOLUTION INTRAMUSCULAR; INTRAVENOUS EVERY 24 HOURS
Refills: 0 | Status: COMPLETED | OUTPATIENT
Start: 2022-04-30 | End: 2022-05-04

## 2022-04-30 RX ORDER — CEFTRIAXONE 500 MG/1
1000 INJECTION, POWDER, FOR SOLUTION INTRAMUSCULAR; INTRAVENOUS ONCE
Refills: 0 | Status: COMPLETED | OUTPATIENT
Start: 2022-04-30 | End: 2022-04-30

## 2022-04-30 RX ORDER — HEPARIN SODIUM 5000 [USP'U]/ML
10000 INJECTION INTRAVENOUS; SUBCUTANEOUS EVERY 6 HOURS
Refills: 0 | Status: DISCONTINUED | OUTPATIENT
Start: 2022-04-30 | End: 2022-05-01

## 2022-04-30 RX ORDER — AZITHROMYCIN 500 MG/1
500 TABLET, FILM COATED ORAL ONCE
Refills: 0 | Status: COMPLETED | OUTPATIENT
Start: 2022-04-30 | End: 2022-04-30

## 2022-04-30 RX ORDER — HEPARIN SODIUM 5000 [USP'U]/ML
5000 INJECTION INTRAVENOUS; SUBCUTANEOUS EVERY 8 HOURS
Refills: 0 | Status: DISCONTINUED | OUTPATIENT
Start: 2022-04-30 | End: 2022-05-01

## 2022-04-30 RX ORDER — HEPARIN SODIUM 5000 [USP'U]/ML
10000 INJECTION INTRAVENOUS; SUBCUTANEOUS ONCE
Refills: 0 | Status: COMPLETED | OUTPATIENT
Start: 2022-04-30 | End: 2022-04-30

## 2022-04-30 RX ORDER — AZITHROMYCIN 500 MG/1
500 TABLET, FILM COATED ORAL DAILY
Refills: 0 | Status: DISCONTINUED | OUTPATIENT
Start: 2022-04-30 | End: 2022-05-05

## 2022-04-30 RX ADMIN — HEPARIN SODIUM 10000 UNIT(S): 5000 INJECTION INTRAVENOUS; SUBCUTANEOUS at 22:55

## 2022-04-30 RX ADMIN — HEPARIN SODIUM 2400 UNIT(S)/HR: 5000 INJECTION INTRAVENOUS; SUBCUTANEOUS at 22:54

## 2022-04-30 RX ADMIN — AZITHROMYCIN 255 MILLIGRAM(S): 500 TABLET, FILM COATED ORAL at 19:25

## 2022-04-30 RX ADMIN — CEFTRIAXONE 100 MILLIGRAM(S): 500 INJECTION, POWDER, FOR SOLUTION INTRAMUSCULAR; INTRAVENOUS at 19:20

## 2022-04-30 NOTE — H&P ADULT - NSHPPHYSICALEXAM_GEN_ALL_CORE
Vital Signs Last 24 Hrs  T(C): 36.8 (30 Apr 2022 15:02), Max: 36.9 (30 Apr 2022 12:00)  T(F): 98.2 (30 Apr 2022 15:02), Max: 98.4 (30 Apr 2022 12:00)  HR: 84 (30 Apr 2022 15:02) (84 - 94)  BP: 121/75 (30 Apr 2022 15:02) (117/77 - 121/75)  BP(mean): 89 (30 Apr 2022 15:02) (88 - 89)  RR: 20 (30 Apr 2022 15:02) (20 - 20)  SpO2: 98% (30 Apr 2022 15:02) (88% - 98%)

## 2022-04-30 NOTE — H&P ADULT - NSHPSOCIALHISTORY_GEN_ALL_CORE
Former smoker, smoke for 20 years and quit 20 years ago  Social ETOH (3 drinks per week)  No illicit drugs Pt lives with his wife.  He is a melgoza.  Former smoker, smoke for 20 years and quit 20 years ago  Social ETOH (3 drinks per week)  No illicit drugs

## 2022-04-30 NOTE — H&P ADULT - NEUROLOGICAL
Joint Dislocation Reduction
Alert & oriented; no sensory, motor or coordination deficits, normal reflexes

## 2022-04-30 NOTE — H&P ADULT - NSHPREVIEWOFSYSTEMS_GEN_ALL_CORE
REVIEW OF SYSTEMS:    CONSTITUTIONAL: +weakness, +fevers and chills  EYES/ENT: No visual changes;  No vertigo or throat pain   NECK: No pain or stiffness  RESPIRATORY: +productive cough, No wheezing, No hemoptysis; +shortness of breath  CARDIOVASCULAR: +chest discomfort, No palpitations  GASTROINTESTINAL: No abdominal or epigastric pain. No nausea, vomiting, or hematemesis; No diarrhea or constipation. No melena or hematochezia.  GENITOURINARY: No dysuria, frequency or hematuria  NEUROLOGICAL: No numbness or weakness  SKIN: No itching, rashes

## 2022-04-30 NOTE — ED ADULT NURSE REASSESSMENT NOTE - NS ED NURSE REASSESS COMMENT FT1
Received pt from TOMAS Oquendo, pt ordered for heparin gtt, MD Harrell bedside explaining plan of care to pt and wife, pt assigned bed on 1N

## 2022-04-30 NOTE — H&P ADULT - NSHPOUTPATIENTPROVIDERS_GEN_ALL_CORE
Pain management Dr. Campbell PCP Dr. Cathi Torres,  [facility]  Oncology [facility]  Pain management Dr. Campbell

## 2022-04-30 NOTE — H&P ADULT - ATTENDING COMMENTS
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 ,  and post-obstructive Pna.    Discussed results with pt and his wife in the ED.     - discussed CTA results with CT Surgery Dr. Faria and Oncology Tracy Weems and will arrange for further evaluation and work up  - heparin drip  - cont Antiobiotics  - dupplex LE  - echo  - consult CT surg, Oncology, Pulmonary  - Full Code

## 2022-04-30 NOTE — H&P ADULT - HISTORY OF PRESENT ILLNESS
61yo male with PMHx of prostate cancer, b/l knee replacement here complaining of SOB and chest discomfort that started on the weekend.   Patient mentions his chest discomfrot is non radiating but it is associated with WILEY with minimal movements and SOB all the time.  Patient mentions when he started he also had 100.5 fever and has been having productive cough at the beginning was green but for the past couple of days has been clear.  He has never experienced something similar.    Patient was recently seen in the ED on 4/26 after losing his balance and falling backwards.  He did hit his head at that time, but did not lose consciousness.  Patient denies any dizziness, lightheadedness, headache, nausea, vomiting.      In the ED patient was found to have WBC 20.35, D-dimer 2456, Lactate 1.4, he was givne Azithromycin 500mg x1 and Rocephine 1000mg x1 63yo male with PMHx of prostate cancer, b/l knee replacement here complaining of SOB and chest pain that started on the weekend.   Patient mentions his chest pain is non radiating but it is associated with WILEY with minimal movements and SOB all the time. If he tries to take deep breaths he gets chest pain and worsen SOB.  Patient mentions when he started he also had 100.5 fever and has been having productive cough at the beginning was green but for the past couple of days has been clear.  He has never experienced something similar.    Patient was recently seen in the ED on 4/26 after losing his balance and falling backwards.  He did hit his head at that time, but did not lose consciousness.  Patient denies any dizziness, lightheadedness, headache, nausea, vomiting.    He was treated for Prostate cancer 7 years ago, has not had any recent follow up.    b/l knee replacement on 2004 and 2007.    In the ED patient was found to have WBC 20.35, D-dimer 2456, Lactate 1.4, he was givne Azithromycin 500mg x1 and Rocephine 1000mg x1 61yo male with PMHx of prostate cancer, b/l knee replacement here complaining of SOB and chest pain that started on the weekend.   Patient mentions his chest pain is non radiating but it is associated with WILEY with minimal movements and SOB all the time. If he tries to take deep breaths he gets chest pain and worsen SOB.  Patient mentions when he started he also had 100.5 fever and has been having productive cough at the beginning was green but for the past couple of days has been clear.  He has never experienced something similar.    Patient was recently seen in the ED on 4/26 after losing his balance and falling backwards.  He did hit his head at that time, but did not lose consciousness.  Patient denies any dizziness, lightheadedness, headache, nausea, vomiting.   On further questioning,  pt admitted to loss of appetite over the last eight days.  He mentioned WILEY ongoing for more than six months.   He was treated for Prostate cancer 7 years ago, has not had any recent follow up.    b/l knee replacement on 2004 and 2007.    In the ED patient was found to have WBC 20.35, D-dimer 2456, Lactate 1.4, he was givne Azithromycin 500mg x1 and Rocephine 1000mg x1

## 2022-04-30 NOTE — H&P ADULT - ASSESSMENT
61yo male with PMHx of prostate cancer, b/l knee replacement here complaining of SOB and chest discomfort that started on the weekend.     63yo male with PMHx of prostate cancer, b/l knee replacement here complaining of SOB and chest discomfort that started on the weekend.    Admit to tele     #AHRF  - Possibly 2/2 CAP + likely PE  - D-dimer 2456, HR 90s, SpO2 88% on OLGA,   - Continue O2 NC currently on 4 L, titrate PRN.  goal SpO2 >92%  - f/u PSA Hx of prostate Ca 7 yrs ago, no recent follow up  - Pro-BNP unremarkable. Low suspicion for RV strain. Holding off on TTE to check RV strain  - Monitor vitals closely  - CXR: Pending official read  - Wells score: 4.5.  Moderate risk group: 16.2% chance of PE in an ED population  - f/u stat CTA chest, if PE is confirm will need to start heparin drip  - f/u AM labs    #Chest pain  - Likely pleuritic, exacerbates with deep breaths  - Trops neg x1, will repeat in 6hr  - f/u CTA results to r/o PE  - EKG: Sinus tach  - Monitor vital signs  - f/u AM labs    #Chronic pain  - Patient mentions he takes Hydrocodone as outpatient, does not remember the dose  - Currently not having knee or shoulder pain.  - Will try to get the correct dose tomorrow    #DVT ppx  - Improve VTE score: 1  - SCDs  - Will start Heparin Subq  - f/u CTA results and start AC if needed    #Code status  - Full code    *Case discussed with Dr. Harrell     63yo male with PMHx of prostate cancer, b/l knee replacement here complaining of SOB and chest discomfort that started on the weekend.    Admit to tele     #AHRF 2/2 CAP  - Based on clinical presentation and Physical exam there is an increase probability of a PE  - D-dimer 2456, HR 90s, SpO2 88% on OLGA,   - Continue O2 NC currently on 4 L, titrate PRN.  goal SpO2 >92%  - f/u PSA Hx of prostate Ca 7 yrs ago, no recent follow up  - Pro-BNP unremarkable. Low suspicion for RV strain. Holding off on TTE to check RV strain  - Continue Azithromycin 500mg qd  - Continue Rocephin 1000mg qd  - Monitor vitals closely  - CXR: Pending official read  - Wells score: 4.5.  Moderate risk group: 16.2% chance of PE in an ED population  - f/u stat CTA chest, if PE is confirm will need to start heparin drip  - f/u AM labs    #Chest pain  - Likely pleuritic, exacerbates with deep breaths  - Trops neg x1, will repeat in 6hr  - f/u CTA results to r/o PE  - EKG: Sinus tach  - Monitor vital signs  - f/u AM labs    #Chronic pain  - Patient mentions he takes Hydrocodone as outpatient, does not remember the dose  - Currently not having knee or shoulder pain.  - Will try to get the correct dose tomorrow    #DVT ppx  - Improve VTE score: 1  - SCDs  - Will start Heparin Subq  - f/u CTA results and start AC if needed    #Code status  - Full code    *Case discussed with Dr. Harrell

## 2022-04-30 NOTE — ED ADULT NURSE NOTE - CHIEF COMPLAINT QUOTE
pt c/o CP and SOB beginning last sunday 4/23. pt was seen in [facility] on tuesday (4/26) for a fall. pt had x2 negative covid test. hx- bilat knee replacements, prostate ca.

## 2022-04-30 NOTE — H&P ADULT - NSICDXFAMILYHX_GEN_ALL_CORE_FT
FAMILY HISTORY:  Father  Still living? No  Family history of lung cancer, Age at diagnosis: 71-80    Mother  Still living? No  Family history of thyroid cancer, Age at diagnosis: 71-80

## 2022-04-30 NOTE — ED ADULT TRIAGE NOTE - CHIEF COMPLAINT QUOTE
pt c/o CP and SOB beginning last sunday 4/23. pt was seen in [facility] on tuesday (4/26) for a fall. pt had x2 negative covid test. hx- bilat knee replacements, prostate ca.

## 2022-04-30 NOTE — ED PROVIDER NOTE - OBJECTIVE STATEMENT
62 M hx prostate cancer, b/l knee replacements here complaining of  slight non radiating non exertional chest pain and shortness of breath for the past 1 week. pt also reports having a cough and had green phlegm but states as the week went on the phlegm became more clear. reports having a low grade fever, 100.5.  pt states he tried to see his PMD, Dr. Torres, but due to his cough they could not see him. pt also states that on Tuesday, 4/26 he reports he had a fall backwards and fell down approx 6 steps with + head injury and was able to get up on his own afterwards and was evaluated here afterwards at [facility] ED.  pt came to the ED today due to difficulty breathing, cough, and chest pain. former smoker, quit 20 years ago and states that he smoked for 20 years. reports he drinks 3 ETOH drinks per week.

## 2022-04-30 NOTE — PROVIDER CONTACT NOTE (OTHER) - SITUATION
DR. KRYSTLE MADRID AWARE/SPOKE WITH DR. PIKE.
Spoke to service line aware of consult
Spoke to service line aware of consult.

## 2022-04-30 NOTE — ED PROVIDER NOTE - CLINICAL SUMMARY MEDICAL DECISION MAKING FREE TEXT BOX
pt with a hx of chronic pain here with complaint of shortness of breath, cough, and chest pain for 1 week. pt also had a fall after chest pain and shortness of breath started. pt hypoxic with rales on the right base. r/o pneumonia. r/o PE

## 2022-04-30 NOTE — ED ADULT NURSE NOTE - OBJECTIVE STATEMENT
Patient states he fell the other day and was in the ED for lac repair, past 3 days patient states he has been having "some shortness of breath " and "left sided CP". Patient is alert and oriented , color good.

## 2022-05-01 LAB
APTT BLD: 49.7 SEC — HIGH (ref 27.5–35.5)
APTT BLD: 71.8 SEC — HIGH (ref 27.5–35.5)
HCT VFR BLD CALC: 40 % — SIGNIFICANT CHANGE UP (ref 39–50)
HCV AB S/CO SERPL IA: 0.26 S/CO — SIGNIFICANT CHANGE UP (ref 0–0.99)
HCV AB SERPL-IMP: SIGNIFICANT CHANGE UP
HGB BLD-MCNC: 13.3 G/DL — SIGNIFICANT CHANGE UP (ref 13–17)
MCHC RBC-ENTMCNC: 31.8 PG — SIGNIFICANT CHANGE UP (ref 27–34)
MCHC RBC-ENTMCNC: 33.3 GM/DL — SIGNIFICANT CHANGE UP (ref 32–36)
MCV RBC AUTO: 95.7 FL — SIGNIFICANT CHANGE UP (ref 80–100)
PLATELET # BLD AUTO: 140 K/UL — LOW (ref 150–400)
PSA FLD-MCNC: <0.01 NG/ML — SIGNIFICANT CHANGE UP (ref 0–4)
RBC # BLD: 4.18 M/UL — LOW (ref 4.2–5.8)
RBC # FLD: 14 % — SIGNIFICANT CHANGE UP (ref 10.3–14.5)
WBC # BLD: 18.37 K/UL — HIGH (ref 3.8–10.5)
WBC # FLD AUTO: 18.37 K/UL — HIGH (ref 3.8–10.5)

## 2022-05-01 PROCEDURE — 99232 SBSQ HOSP IP/OBS MODERATE 35: CPT

## 2022-05-01 PROCEDURE — 93970 EXTREMITY STUDY: CPT | Mod: 26

## 2022-05-01 RX ORDER — ENOXAPARIN SODIUM 100 MG/ML
40 INJECTION SUBCUTANEOUS EVERY 12 HOURS
Refills: 0 | Status: DISCONTINUED | OUTPATIENT
Start: 2022-05-01 | End: 2022-05-05

## 2022-05-01 RX ADMIN — HEPARIN SODIUM 2700 UNIT(S)/HR: 5000 INJECTION INTRAVENOUS; SUBCUTANEOUS at 08:27

## 2022-05-01 RX ADMIN — HEPARIN SODIUM 2700 UNIT(S)/HR: 5000 INJECTION INTRAVENOUS; SUBCUTANEOUS at 12:46

## 2022-05-01 RX ADMIN — HEPARIN SODIUM 5000 UNIT(S): 5000 INJECTION INTRAVENOUS; SUBCUTANEOUS at 05:38

## 2022-05-01 RX ADMIN — AZITHROMYCIN 500 MILLIGRAM(S): 500 TABLET, FILM COATED ORAL at 09:51

## 2022-05-01 RX ADMIN — HEPARIN SODIUM 2700 UNIT(S)/HR: 5000 INJECTION INTRAVENOUS; SUBCUTANEOUS at 05:33

## 2022-05-01 RX ADMIN — CEFTRIAXONE 100 MILLIGRAM(S): 500 INJECTION, POWDER, FOR SOLUTION INTRAMUSCULAR; INTRAVENOUS at 20:59

## 2022-05-01 RX ADMIN — HEPARIN SODIUM 2400 UNIT(S)/HR: 5000 INJECTION INTRAVENOUS; SUBCUTANEOUS at 05:30

## 2022-05-01 RX ADMIN — HEPARIN SODIUM 2700 UNIT(S)/HR: 5000 INJECTION INTRAVENOUS; SUBCUTANEOUS at 07:22

## 2022-05-01 RX ADMIN — ENOXAPARIN SODIUM 40 MILLIGRAM(S): 100 INJECTION SUBCUTANEOUS at 22:43

## 2022-05-01 NOTE — PATIENT PROFILE ADULT - FALL HARM RISK - HARM RISK INTERVENTIONS

## 2022-05-01 NOTE — CONSULT NOTE ADULT - ASSESSMENT
63yo male with PMHx of prostate cancer, b/l knee replacement here complaining of SOB and chest pain found to have 6 cm lung mass.     FULL CONSULT TO FOLLOW  61yo male with PMHx of prostate cancer, b/l knee replacement here complaining of SOB and chest pain now found to have a 6x6.1x3.8 right hilar mass with enlarged mediastinal, hilar LN and tumor embolus w/in smaller branches in RUL.     # lung mass  - CT chest- A likely malignant right hilar mass is partially encasing the central right pulmonary arteries a tumoral embolus within the smaller branches in the right upper lobe not entirely excluded. An enlarged mediastinal, hilar lymph nodes, suspect malignant Again, an ill-defined spiculated right hilar mass measures at least a 6.0 x6.1 x 3.8 cm. An additional patchy opacities, consolidations in the lower lobes bilaterally and the left middle lobe, not excluding superimposed pneumonia and atelectasis. A pulmonary nodularity, a dominant nodule in the right lung base measures 2.0 cm.  - pulm consulted  - will need EBUS with mediastinal and hilar LN sampling for staging purposes  - need tissue diagnosis  - would recommend CT a/p with contrast for metastatic workup while inpatient   - CTH w/o contrast but negative- due to mediastinal LN concern and advanced staging, would recommend MRI brain with contrast   - will need PET scan outpatient =    # tumor thrombus  - Definitive therapy for pulmonary tumor embolism is directed at treating the primary tumor. 2019 study showed that there is no significant difference in survival between patients with tumour thrombus treated with or without anticoagulation.   - pts typically do not require anticoagulation unless another indication  - NEGATIVE LE US doppler - no DVT   - no history of blood clot in past   - currently on hep gtt      Dr. Hong Molina  cell: [phone]  Weekends and nights please call [phone] for MD on call  [facility]  Hematology/Oncology       61yo male with PMHx of prostate cancer, b/l knee replacement here complaining of SOB and chest pain now found to have a 6x6.1x3.8 right hilar mass with enlarged mediastinal, hilar LN and tumor embolus w/in smaller branches in RUL.     # lung mass  - CT chest- A likely malignant right hilar mass is partially encasing the central right pulmonary arteries a tumoral embolus within the smaller branches in the right upper lobe not entirely excluded. An enlarged mediastinal, hilar lymph nodes, suspect malignant Again, an ill-defined spiculated right hilar mass measures at least a 6.0 x6.1 x 3.8 cm. An additional patchy opacities, consolidations in the lower lobes bilaterally and the left middle lobe, not excluding superimposed pneumonia and atelectasis. A pulmonary nodularity, a dominant nodule in the right lung base measures 2.0 cm.  - pulm consulted  - may need EBUS with mediastinal and hilar LN sampling for staging purposes- will discuss with pulm  - other option inludes tissue diagnosis by biopsy of supraclavicular node by IR   - would recommend CT a/p with contrast for metastatic workup while inpatient   - CTH w/o contrast but negative- due to mediastinal LN concern and advanced staging, will need MRI brain with contrast   - will need PET scan outpatient    # tumor thrombus  - Definitive therapy for pulmonary tumor embolism is directed at treating the primary tumor. 2019 study showed that there is no significant difference in survival between patients with tumour thrombus treated with or without anticoagulation.   - pts typically do not require anticoagulation unless another indication  - NEGATIVE LE US doppler - no DVT   - no history of blood clot in past   - DISCONTINUE hep gtt      Dr. Hong Molina  cell: [phone]  Weekends and nights please call [phone] for MD on call  [facility]  Hematology/Oncology

## 2022-05-01 NOTE — PROGRESS NOTE ADULT - SUBJECTIVE AND OBJECTIVE BOX
61yo male with PMHx of prostate cancer, b/l knee replacement here complaining of SOB and chest pain that started on the weekend.   Patient mentions his chest pain is non radiating but it is associated with WILEY with minimal movements and SOB all the time. If he tries to take deep breaths he gets chest pain and worsen SOB.  Patient mentions when he started he also had 100.5 fever and has been having productive cough at the beginning was green but for the past couple of days has been clear.  He has never experienced something similar.    Patient was recently seen in the ED on 4/26 after losing his balance and falling backwards.  He did hit his head at that time, but did not lose consciousness.  Patient denies any dizziness, lightheadedness, headache, nausea, vomiting.   On further questioning,  pt admitted to loss of appetite over the last eight days.  He mentioned WILEY ongoing for more than six months.   He was treated for Prostate cancer 7 years ago, has not had any recent follow up.    b/l knee replacement on 2004 and 2007.    In the ED patient was found to have WBC 20.35, D-dimer 2456, Lactate 1.4, he was givne Azithromycin 500mg x1 and Rocephine 1000mg x1    Subjective - Pt was seen by his bedside, in mild resp distress. Pt is complaining of mild cough.     REVIEW OF SYSTEMS:    CONSTITUTIONAL: +weakness, +fevers and chills  EYES/ENT: No visual changes;  No vertigo or throat pain   NECK: No pain or stiffness  RESPIRATORY: +productive cough, No wheezing, No hemoptysis; +shortness of breath  CARDIOVASCULAR: +chest discomfort, No palpitations  GASTROINTESTINAL: No abdominal or epigastric pain. No nausea, vomiting, or hematemesis; No diarrhea or constipation. No melena or hematochezia.  GENITOURINARY: No dysuria, frequency or hematuria  NEUROLOGICAL: No numbness or weakness  SKIN: No itching, rashes    Examination-    GENERAL: NAD, lying in bed comfortably  HEAD:  Atraumatic, Normocephalic  EYES: EOMI, PERRLA, conjunctiva and sclera clear  ENT: Moist mucous membranes  NECK: Supple, No JVD  CHEST/LUNG: Clear to auscultation bilaterally; No rales, rhonchi, wheezing, or rubs. Unlabored respirations  HEART: Regular rate and rhythm; No murmurs, rubs, or gallops  ABDOMEN: Bowel sounds present; Soft, Nontender, Nondistended. No hepatomegally  EXTREMITIES:  2+ Peripheral Pulses, brisk capillary refill. No clubbing, cyanosis, or edema  NERVOUS SYSTEM:  Alert & Oriented X3, speech clear. No deficits   MSK: FROM all 4 extremities, full and equal strength  SKIN: No rashes or lesions    Vital Signs Last 24 Hrs  T(C): 36.2 (01 May 2022 15:12), Max: 36.9 (01 May 2022 07:30)  T(F): 97.2 (01 May 2022 15:12), Max: 98.4 (01 May 2022 07:30)  HR: 83 (01 May 2022 15:12) (79 - 91)  BP: 145/76 (01 May 2022 15:12) (127/62 - 145/76)  BP(mean): 98 (30 Apr 2022 22:21) (98 - 98)  RR: 18 (01 May 2022 15:12) (18 - 20)  SpO2: 96% (01 May 2022 15:12) (92% - 96%)     Labs  -               13.3   18.37 )-----------( 140      ( 01 May 2022 04:43 )             40.0     04-30    134<L>  |  98  |  18  ----------------------------<  96  3.5   |  27  |  0.97    Ca    8.2<L>      30 Apr 2022 14:42  Mg     2.6     04-30    TPro  8.0  /  Alb  2.6<L>  /  TBili  0.9  /  DBili  x   /  AST  56<H>  /  ALT  84<H>  /  AlkPhos  94  04-30    MEDICATIONS  (STANDING):  azithromycin   Tablet 500 milliGRAM(s) Oral daily  cefTRIAXone   IVPB 1000 milliGRAM(s) IV Intermittent every 24 hours  enoxaparin Injectable 40 milliGRAM(s) SubCutaneous every 12 hours    MEDICATIONS  (PRN):    < from: CT Angio Chest PE Protocol w/ IV Cont (04.30.22 @ 18:43) >  IMPRESSION: No pulmonary arterial emboli.    1.7 cm right lower lobe pulmonary nodule associated with right hilar and   mediastinal lymphadenopathy worrisome for primary lung neoplasm with   metastatic disease.    Multiple right supraclavicular and retroclavicular lymph nodes may also   represent metastatic disease as described above. These lymph nodes are   amenable to percutaneous ultrasound-guided biopsy.    Nonspecific 5 mm right upper lobe pulmonary nodule.    A few mildly enlarged portacaval lymph nodes are nonspecific.    PET scan can be performed for complete evaluation.    --- End of Report ---    < end of copied text >          cc - dyspnea, cough   61yo male with PMHx of prostate cancer, b/l knee replacement here complaining of SOB and chest pain that started on the weekend.   Patient mentions his chest pain is non radiating but it is associated with WILEY with minimal movements and SOB all the time. If he tries to take deep breaths he gets chest pain and worsen SOB.  Patient mentions when he started he also had 100.5 fever and has been having productive cough at the beginning was green but for the past couple of days has been clear.  He has never experienced something similar.    Patient was recently seen in the ED on 4/26 after losing his balance and falling backwards.  He did hit his head at that time, but did not lose consciousness.  Patient denies any dizziness, lightheadedness, headache, nausea, vomiting.   On further questioning,  pt admitted to loss of appetite over the last eight days.  He mentioned WILEY ongoing for more than six months.   He was treated for Prostate cancer 7 years ago, has not had any recent follow up.    b/l knee replacement on 2004 and 2007.    In the ED patient was found to have WBC 20.35, D-dimer 2456, Lactate 1.4, he was givne Azithromycin 500mg x1 and Rocephine 1000mg x1    Subjective - Pt was seen by his bedside, in mild resp distress. Pt is complaining of mild cough.     REVIEW OF SYSTEMS:    CONSTITUTIONAL: +weakness, +fevers and chills  EYES/ENT: No visual changes;  No vertigo or throat pain   NECK: No pain or stiffness  RESPIRATORY: +productive cough, No wheezing, No hemoptysis; +shortness of breath  CARDIOVASCULAR: +chest discomfort, No palpitations  GASTROINTESTINAL: No abdominal or epigastric pain. No nausea, vomiting, or hematemesis; No diarrhea or constipation. No melena or hematochezia.  GENITOURINARY: No dysuria, frequency or hematuria  NEUROLOGICAL: No numbness or weakness  SKIN: No itching, rashes    Examination-    GENERAL: NAD, lying in bed comfortably  HEAD:  Atraumatic, Normocephalic  EYES: EOMI, PERRLA, conjunctiva and sclera clear  ENT: Moist mucous membranes  NECK: Supple, No JVD  CHEST/LUNG: Clear to auscultation bilaterally; No rales, rhonchi, wheezing, or rubs. Unlabored respirations  HEART: Regular rate and rhythm; No murmurs, rubs, or gallops  ABDOMEN: Bowel sounds present; Soft, Nontender, Nondistended. No hepatomegally  EXTREMITIES:  2+ Peripheral Pulses, brisk capillary refill. No clubbing, cyanosis, or edema  NERVOUS SYSTEM:  Alert & Oriented X3, speech clear. No deficits   MSK: FROM all 4 extremities, full and equal strength  SKIN: No rashes or lesions    Vital Signs Last 24 Hrs  T(C): 36.2 (01 May 2022 15:12), Max: 36.9 (01 May 2022 07:30)  T(F): 97.2 (01 May 2022 15:12), Max: 98.4 (01 May 2022 07:30)  HR: 83 (01 May 2022 15:12) (79 - 91)  BP: 145/76 (01 May 2022 15:12) (127/62 - 145/76)  BP(mean): 98 (30 Apr 2022 22:21) (98 - 98)  RR: 18 (01 May 2022 15:12) (18 - 20)  SpO2: 96% (01 May 2022 15:12) (92% - 96%)     Labs  -  all reviewed              13.3   18.37 )-----------( 140      ( 01 May 2022 04:43 )             40.0     04-30    134<L>  |  98  |  18  ----------------------------<  96  3.5   |  27  |  0.97    Ca    8.2<L>      30 Apr 2022 14:42  Mg     2.6     04-30    TPro  8.0  /  Alb  2.6<L>  /  TBili  0.9  /  DBili  x   /  AST  56<H>  /  ALT  84<H>  /  AlkPhos  94  04-30    MEDICATIONS  (STANDING):  azithromycin   Tablet 500 milliGRAM(s) Oral daily  cefTRIAXone   IVPB 1000 milliGRAM(s) IV Intermittent every 24 hours  enoxaparin Injectable 40 milliGRAM(s) SubCutaneous every 12 hours    MEDICATIONS  (PRN):    < from: CT Angio Chest PE Protocol w/ IV Cont (04.30.22 @ 18:43) >  IMPRESSION: No pulmonary arterial emboli.    1.7 cm right lower lobe pulmonary nodule associated with right hilar and   mediastinal lymphadenopathy worrisome for primary lung neoplasm with   metastatic disease.    Multiple right supraclavicular and retroclavicular lymph nodes may also   represent metastatic disease as described above. These lymph nodes are   amenable to percutaneous ultrasound-guided biopsy.    Nonspecific 5 mm right upper lobe pulmonary nodule.    A few mildly enlarged portacaval lymph nodes are nonspecific.    PET scan can be performed for complete evaluation.    --- End of Report ---    < end of copied text >

## 2022-05-01 NOTE — CONSULT NOTE ADULT - SUBJECTIVE AND OBJECTIVE BOX
HPI:  63yo male with PMHx of prostate cancer, b/l knee replacement here complaining of SOB and chest pain that started on the weekend.   Patient mentions his chest pain is non radiating but it is associated with WILEY with minimal movements and SOB all the time. If he tries to take deep breaths he gets chest pain and worsen SOB.  Patient mentions when he started he also had 100.5 fever and has been having productive cough at the beginning was green but for the past couple of days has been clear.  He has never experienced something similar.    Patient was recently seen in the ED on  after losing his balance and falling backwards.  He did hit his head at that time, but did not lose consciousness.  Patient denies any dizziness, lightheadedness, headache, nausea, vomiting.   On further questioning,  pt admitted to loss of appetite over the last eight days.  He mentioned WILEY ongoing for more than six months.   He was treated for Prostate cancer 7 years ago, has not had any recent follow up.    b/l knee replacement on  and .    In the ED patient was found to have WBC 20.35, D-dimer 2456, Lactate 1.4, he was givne Azithromycin 500mg x1 and Rocephine 1000mg x1 (2022 18:10)      PAST MEDICAL & SURGICAL HISTORY:  Prostate CA    History of total bilateral knee replacement (TKR)        Allergies    tetracyclines (Unknown)    Intolerances        MEDICATIONS  (STANDING):  azithromycin   Tablet 500 milliGRAM(s) Oral daily  cefTRIAXone   IVPB 1000 milliGRAM(s) IV Intermittent every 24 hours  heparin   Injectable 5000 Unit(s) SubCutaneous every 8 hours  heparin  Infusion.  Unit(s)/Hr (24 mL/Hr) IV Continuous <Continuous>    MEDICATIONS  (PRN):  heparin   Injectable 98852 Unit(s) IV Push every 6 hours PRN For aPTT less than 40  heparin   Injectable 5000 Unit(s) IV Push every 6 hours PRN For aPTT between 40 - 57      FAMILY HISTORY:  Family history of thyroid cancer (Mother)    Family history of lung cancer (Father)  had mesothelioma,   in his 90s        SOCIAL HISTORY: No EtOH, no tobacco    REVIEW OF SYSTEMS:    CONSTITUTIONAL: No weakness, fevers or chills  EYES/ENT: No visual changes;  No vertigo or throat pain   NECK: No pain or stiffness  RESPIRATORY: No cough, wheezing, hemoptysis; No shortness of breath  CARDIOVASCULAR: No chest pain or palpitations  GASTROINTESTINAL: No abdominal or epigastric pain. No nausea, vomiting, or hematemesis; No diarrhea or constipation. No melena or hematochezia.  GENITOURINARY: No dysuria, frequency or hematuria  NEUROLOGICAL: No numbness or weakness  SKIN: No itching, burning, rashes, or lesions   All other review of systems is negative unless indicated above.    Height (cm): 190.5 ( @ 11:49)  Weight (kg): 131.7 ( @ 22:06)  BMI (kg/m2): 36.3 ( @ 22:06)  BSA (m2): 2.57 ( @ 22:06)    T(F): 98.4 (22 @ 07:30), Max: 98.4 (22 @ 12:00)  HR: 79 (22 @ 07:30)  BP: 132/66 (22 @ 07:30)  RR: 20 (22 @ 07:30)  SpO2: 94% (22 @ 07:30)  Wt(kg): --    GENERAL: NAD, well-developed  HEAD:  Atraumatic, Normocephalic  EYES: EOMI, PERRLA, conjunctiva and sclera clear  NECK: Supple, No JVD  CHEST/LUNG: Clear to auscultation bilaterally; No wheeze  HEART: Regular rate and rhythm; No murmurs, rubs, or gallops  ABDOMEN: Soft, Nontender, Nondistended; Bowel sounds present  EXTREMITIES:  2+ Peripheral Pulses, No clubbing, cyanosis, or edema  NEUROLOGY: non-focal  SKIN: No rashes or lesions                          13.3   18.37 )-----------( 140      ( 01 May 2022 04:43 )             40.0           134<L>  |  98  |  18  ----------------------------<  96  3.5   |  27  |  0.97    Ca    8.2<L>      2022 14:42  Mg     2.6         TPro  8.0  /  Alb  2.6<L>  /  TBili  0.9  /  DBili  x   /  AST  56<H>  /  ALT  84<H>  /  AlkPhos  94        Magnesium, Serum: 2.6 mg/dL ( @ 14:42)      PT/INR - ( 2022 14:42 )   PT: 14.1 sec;   INR: 1.21 ratio         PTT - ( 01 May 2022 04:43 )  PTT:49.7 sec     HPI:  63 yo male with PMHx of prostate cancer, b/l knee replacement here complaining of SOB and chest pain now found to have a 6x6.1x3.8 right hilar mass with enlarged mediastinal, hilar LN and tumor embolus w/in smaller branches in RUL.     atient mentions his chest pain is non radiating but it is associated with WILEY with minimal movements and SOB all the time. If he tries to take deep breaths he gets chest pain and worsen SOB.  Patient mentions when he started he also had 100.5 fever and has been having productive cough at the beginning was green but for the past couple of days has been clear.  He has never experienced something similar.    Patient was recently seen in the ED on  after losing his balance and falling backwards.  He did hit his head at that time, but did not lose consciousness.  Patient denies any dizziness, lightheadedness, headache, nausea, vomiting.   On further questioning,  pt admitted to loss of appetite over the last eight days.  He mentioned WILEY ongoing for more than six months.   He was treated for Prostate cancer 7 years ago, has not had any recent follow up.    b/l knee replacement on  and .    In the ED patient was found to have WBC 20.35, D-dimer 2456, Lactate 1.4, he was givne Azithromycin 500mg x1 and Rocephine 1000mg x1     CT A likely malignant right hilar mass is partially encasing the central right pulmonary arteries a tumoral embolus within the smaller branches in the right upper lobe not entirely excluded. An enlarged mediastinal, hilar lymph nodes, suspect malignant Again, an ill-defined spiculated right hilar mass measures at least a 6.0 x6.1 x 3.8 cm. An additional patchy opacities, consolidations in the lower lobes bilaterally and the left middle lobe, not excluding superimposed pneumonia and atelectasis.  A pulmonary nodularity, a dominant nodule in the right lung base measures 2.0 cm.      PAST MEDICAL & SURGICAL HISTORY:  Prostate CA    History of total bilateral knee replacement (TKR)        Allergies    tetracyclines (Unknown)    Intolerances        MEDICATIONS  (STANDING):  azithromycin   Tablet 500 milliGRAM(s) Oral daily  cefTRIAXone   IVPB 1000 milliGRAM(s) IV Intermittent every 24 hours  heparin   Injectable 5000 Unit(s) SubCutaneous every 8 hours  heparin  Infusion.  Unit(s)/Hr (24 mL/Hr) IV Continuous <Continuous>    MEDICATIONS  (PRN):  heparin   Injectable 72077 Unit(s) IV Push every 6 hours PRN For aPTT less than 40  heparin   Injectable 5000 Unit(s) IV Push every 6 hours PRN For aPTT between 40 - 57      FAMILY HISTORY:  Family history of thyroid cancer (Mother)    Family history of lung cancer (Father)  had mesothelioma,   in his 90s        SOCIAL HISTORY: No EtOH, no tobacco    REVIEW OF SYSTEMS:    CONSTITUTIONAL: No weakness, fevers or chills  EYES/ENT: No visual changes;  No vertigo or throat pain   NECK: No pain or stiffness  RESPIRATORY: No cough, wheezing, hemoptysis; No shortness of breath  CARDIOVASCULAR: No chest pain or palpitations  GASTROINTESTINAL: No abdominal or epigastric pain. No nausea, vomiting, or hematemesis; No diarrhea or constipation. No melena or hematochezia.  GENITOURINARY: No dysuria, frequency or hematuria  NEUROLOGICAL: No numbness or weakness  SKIN: No itching, burning, rashes, or lesions   All other review of systems is negative unless indicated above.    Height (cm): 190.5 ( @ 11:49)  Weight (kg): 131.7 ( @ 22:06)  BMI (kg/m2): 36.3 ( @ 22:06)  BSA (m2): 2.57 ( @ 22:06)    T(F): 98.4 (22 @ 07:30), Max: 98.4 (22 @ 12:00)  HR: 79 (22 @ 07:30)  BP: 132/66 (22 @ 07:30)  RR: 20 (22 @ 07:30)  SpO2: 94% (22 @ 07:30)  Wt(kg): --    GENERAL: NAD, well-developed  HEAD:  Atraumatic, Normocephalic  EYES: EOMI, PERRLA, conjunctiva and sclera clear  NECK: Supple, No JVD  CHEST/LUNG: Clear to auscultation bilaterally; No wheeze  HEART: Regular rate and rhythm; No murmurs, rubs, or gallops  ABDOMEN: Soft, Nontender, Nondistended; Bowel sounds present  EXTREMITIES:  2+ Peripheral Pulses, No clubbing, cyanosis, or edema  NEUROLOGY: non-focal  SKIN: No rashes or lesions                          13.3   18.37 )-----------( 140      ( 01 May 2022 04:43 )             40.0           134<L>  |  98  |  18  ----------------------------<  96  3.5   |  27  |  0.97    Ca    8.2<L>      2022 14:42  Mg     2.6         TPro  8.0  /  Alb  2.6<L>  /  TBili  0.9  /  DBili  x   /  AST  56<H>  /  ALT  84<H>  /  AlkPhos  94        Magnesium, Serum: 2.6 mg/dL ( @ 14:42)      PT/INR - ( 2022 14:42 )   PT: 14.1 sec;   INR: 1.21 ratio         PTT - ( 01 May 2022 04:43 )  PTT:49.7 sec

## 2022-05-02 LAB
24R-OH-CALCIDIOL SERPL-MCNC: 17.8 NG/ML — LOW (ref 30–80)
ALBUMIN SERPL ELPH-MCNC: 2.6 G/DL — LOW (ref 3.3–5)
ALP SERPL-CCNC: 74 U/L — SIGNIFICANT CHANGE UP (ref 40–120)
ALT FLD-CCNC: 73 U/L — SIGNIFICANT CHANGE UP (ref 12–78)
ANION GAP SERPL CALC-SCNC: 8 MMOL/L — SIGNIFICANT CHANGE UP (ref 5–17)
AST SERPL-CCNC: 46 U/L — HIGH (ref 15–37)
BILIRUB SERPL-MCNC: 0.6 MG/DL — SIGNIFICANT CHANGE UP (ref 0.2–1.2)
BUN SERPL-MCNC: 13 MG/DL — SIGNIFICANT CHANGE UP (ref 7–23)
CALCIUM SERPL-MCNC: 8.6 MG/DL — SIGNIFICANT CHANGE UP (ref 8.5–10.1)
CEA SERPL-MCNC: 0.9 NG/ML — SIGNIFICANT CHANGE UP (ref 0–3.8)
CHLORIDE SERPL-SCNC: 102 MMOL/L — SIGNIFICANT CHANGE UP (ref 96–108)
CO2 SERPL-SCNC: 28 MMOL/L — SIGNIFICANT CHANGE UP (ref 22–31)
CREAT SERPL-MCNC: 0.92 MG/DL — SIGNIFICANT CHANGE UP (ref 0.5–1.3)
EGFR: 93 ML/MIN/1.73M2 — SIGNIFICANT CHANGE UP
GLUCOSE SERPL-MCNC: 109 MG/DL — HIGH (ref 70–99)
HCT VFR BLD CALC: 44.8 % — SIGNIFICANT CHANGE UP (ref 39–50)
HGB BLD-MCNC: 14.4 G/DL — SIGNIFICANT CHANGE UP (ref 13–17)
MCHC RBC-ENTMCNC: 31.3 PG — SIGNIFICANT CHANGE UP (ref 27–34)
MCHC RBC-ENTMCNC: 32.1 GM/DL — SIGNIFICANT CHANGE UP (ref 32–36)
MCV RBC AUTO: 97.4 FL — SIGNIFICANT CHANGE UP (ref 80–100)
PLATELET # BLD AUTO: SIGNIFICANT CHANGE UP (ref 150–400)
POTASSIUM SERPL-MCNC: 3.9 MMOL/L — SIGNIFICANT CHANGE UP (ref 3.5–5.3)
POTASSIUM SERPL-SCNC: 3.9 MMOL/L — SIGNIFICANT CHANGE UP (ref 3.5–5.3)
PROT SERPL-MCNC: 8.2 GM/DL — SIGNIFICANT CHANGE UP (ref 6–8.3)
RBC # BLD: 4.6 M/UL — SIGNIFICANT CHANGE UP (ref 4.2–5.8)
RBC # FLD: 14.4 % — SIGNIFICANT CHANGE UP (ref 10.3–14.5)
SODIUM SERPL-SCNC: 138 MMOL/L — SIGNIFICANT CHANGE UP (ref 135–145)
WBC # BLD: 15.41 K/UL — HIGH (ref 3.8–10.5)
WBC # FLD AUTO: 15.41 K/UL — HIGH (ref 3.8–10.5)

## 2022-05-02 PROCEDURE — 74177 CT ABD & PELVIS W/CONTRAST: CPT | Mod: 26

## 2022-05-02 PROCEDURE — 93306 TTE W/DOPPLER COMPLETE: CPT | Mod: 26

## 2022-05-02 PROCEDURE — 99233 SBSQ HOSP IP/OBS HIGH 50: CPT | Mod: GC

## 2022-05-02 RX ADMIN — CEFTRIAXONE 100 MILLIGRAM(S): 500 INJECTION, POWDER, FOR SOLUTION INTRAMUSCULAR; INTRAVENOUS at 21:51

## 2022-05-02 RX ADMIN — ENOXAPARIN SODIUM 40 MILLIGRAM(S): 100 INJECTION SUBCUTANEOUS at 21:51

## 2022-05-02 RX ADMIN — ENOXAPARIN SODIUM 40 MILLIGRAM(S): 100 INJECTION SUBCUTANEOUS at 09:58

## 2022-05-02 RX ADMIN — AZITHROMYCIN 500 MILLIGRAM(S): 500 TABLET, FILM COATED ORAL at 09:59

## 2022-05-02 NOTE — CDI QUERY NOTE - NSCDIOTHERTXTBX_GEN_ALL_CORE_HH
Per PN 5/1: "  Acute hypoxic respiratory failure due to PNA"    ER Documentation reveals : " SEPSIS – was this patient treated for sepsis?   Yes. "    On admission:  WBC= 20.35  Temp= 98.4  HR= 94  RR=20, O2 sat 88%  BP= 117/77    Please clarify if the above clinical indicators are indicative of a further diagnosis  a) Sepsis, present on admission due to Pneumonia  b) No clinical evidence of Sepsis, sepsis ruled-out  c) Other, please clarify

## 2022-05-02 NOTE — PROGRESS NOTE ADULT - SUBJECTIVE AND OBJECTIVE BOX
cc - dyspnea, cough   63yo male with PMHx of prostate cancer, b/l knee replacement here complaining of SOB and chest pain that started on the weekend.   Patient mentions his chest pain is non radiating but it is associated with WILEY with minimal movements and SOB all the time. If he tries to take deep breaths he gets chest pain and worsen SOB.  Patient mentions when he started he also had 100.5 fever and has been having productive cough at the beginning was green but for the past couple of days has been clear.  He has never experienced something similar.    Patient was recently seen in the ED on 4/26 after losing his balance and falling backwards.  He did hit his head at that time, but did not lose consciousness.  Patient denies any dizziness, lightheadedness, headache, nausea, vomiting.   On further questioning,  pt admitted to loss of appetite over the last eight days.  He mentioned WILEY ongoing for more than six months.   He was treated for Prostate cancer 7 years ago, has not had any recent follow up.    b/l knee replacement on 2004 and 2007.    In the ED patient was found to have WBC 20.35, D-dimer 2456, Lactate 1.4, he was givne Azithromycin 500mg x1 and Rocephine 1000mg x1    Subjective - Pt was seen by his bedside, not in acute distress. Pt is feeling much better   REVIEW OF SYSTEMS:    CONSTITUTIONAL: no weakness, nofevers and chills  EYES/ENT: No visual changes;  No vertigo or throat pain   NECK: No pain or stiffness  RESPIRATORY: +productive cough, No wheezing, No hemoptysis; +shortness of breath  CARDIOVASCULAR: +chest discomfort, No palpitations  GASTROINTESTINAL: No abdominal or epigastric pain. No nausea, vomiting, or hematemesis; No diarrhea or constipation. No melena or hematochezia.  GENITOURINARY: No dysuria, frequency or hematuria  NEUROLOGICAL: No numbness or weakness  SKIN: No itching, rashes    Examination-    GENERAL: NAD, lying in bed comfortably  HEAD:  Atraumatic, Normocephalic  EYES: EOMI, PERRLA, conjunctiva and sclera clear  ENT: Moist mucous membranes  NECK: Supple, No JVD  CHEST/LUNG: Clear to auscultation bilaterally; No rales, rhonchi, wheezing, or rubs. Unlabored respirations  HEART: Regular rate and rhythm; No murmurs, rubs, or gallops  ABDOMEN: Bowel sounds present; Soft, Nontender, Nondistended. No hepatomegally  EXTREMITIES:  2+ Peripheral Pulses, brisk capillary refill. No clubbing, cyanosis, or edema  NERVOUS SYSTEM:  Alert & Oriented X3, speech clear. No deficits   MSK: FROM all 4 extremities, full and equal strength  SKIN: No rashes or lesions    Vital Signs Last 24 Hrs  T(C): 36.4 (02 May 2022 14:53), Max: 36.9 (01 May 2022 23:00)  T(F): 97.6 (02 May 2022 14:53), Max: 98.5 (02 May 2022 07:39)  HR: 80 (02 May 2022 14:53) (80 - 82)  BP: 122/63 (02 May 2022 14:53) (119/60 - 137/75)  BP(mean): --  RR: 20 (02 May 2022 14:53) (18 - 20)  SpO2: 96% (02 May 2022 14:53) (96% - 97%)     Labs  -  all reviewed                               14.4   15.41 )-----------( Clumped    ( 02 May 2022 08:49 )             44.8                         14.4   15.41 )-----------( Clumped    ( 02 May 2022 08:49 )             44.8     MEDICATIONS  (STANDING):  azithromycin   Tablet 500 milliGRAM(s) Oral daily  cefTRIAXone   IVPB 1000 milliGRAM(s) IV Intermittent every 24 hours  enoxaparin Injectable 40 milliGRAM(s) SubCutaneous every 12 hours    MEDICATIONS  (PRN):      < from: CT Angio Chest PE Protocol w/ IV Cont (04.30.22 @ 18:43) >  IMPRESSION: No pulmonary arterial emboli.    1.7 cm right lower lobe pulmonary nodule associated with right hilar and   mediastinal lymphadenopathy worrisome for primary lung neoplasm with   metastatic disease.    Multiple right supraclavicular and retroclavicular lymph nodes may also   represent metastatic disease as described above. These lymph nodes are   amenable to percutaneous ultrasound-guided biopsy.    Nonspecific 5 mm right upper lobe pulmonary nodule.    A few mildly enlarged portacaval lymph nodes are nonspecific.    PET scan can be performed for complete evaluation.    --- End of Report ---    < end of copied text >

## 2022-05-02 NOTE — CONSULT NOTE ADULT - SUBJECTIVE AND OBJECTIVE BOX
Patient is a 62y old  Male who presents with a chief complaint of SOB (01 May 2022 17:36)    HPI:  61yo male with PMHx of prostate cancer, b/l knee replacement admitted on  for evaluation of ongoing chest pain associated with shortness of breath, especially when taking deep breaths, associated with fever to 100.5, productive of cough initially green now clear, had recent evaluation in ED after fall backwards when he lost his balance. No recent follow up for the prostate cancer.         PMH: as above  PSH: as above  Meds: per reconciliation sheet, noted below  MEDICATIONS  (STANDING):  azithromycin   Tablet 500 milliGRAM(s) Oral daily  cefTRIAXone   IVPB 1000 milliGRAM(s) IV Intermittent every 24 hours  enoxaparin Injectable 40 milliGRAM(s) SubCutaneous every 12 hours    MEDICATIONS  (PRN):    Allergies    tetracyclines (Unknown)    Intolerances      Social: no smoking, no alcohol, no illegal drugs; no recent travel, no exposure to TB  FAMILY HISTORY:  Family history of thyroid cancer (Mother)    Family history of lung cancer (Father)  had mesothelioma,   in his 90s       no history of premature cardiovascular disease in first degree relatives  ROS: the patient has  no chills, no HA, no dizziness, no sore throat, no blurry vision, no CP, no palpitations,  no abdominal pain, no diarrhea, no N/V, no dysuria, no leg pain, no claudication, no rash, no joint aches, no rectal pain or bleeding, no night sweats  All other systems reviewed and are negative    Vital Signs Last 24 Hrs  T(C): 36.9 (02 May 2022 07:39), Max: 36.9 (01 May 2022 23:00)  T(F): 98.5 (02 May 2022 07:39), Max: 98.5 (02 May 2022 07:39)  HR: 80 (02 May 2022 07:39) (80 - 83)  BP: 137/75 (02 May 2022 07:39) (119/60 - 145/76)  BP(mean): --  RR: 20 (02 May 2022 07:39) (18 - 20)  SpO2: 97% (02 May 2022 07:39) (96% - 97%)  Daily     Daily     PE:    Constitutional: frail looking  HEENT: NC/AT, EOMI, PERRLA, conjunctivae clear; ears and nose atraumatic; pharynx clear  Neck: supple; thyroid not palpable  Back: no tenderness  Respiratory: respiratory effort normal; clear to auscultation  Cardiovascular: S1S2 regular, no murmurs  Abdomen: soft, not tender, not distended, positive BS; no liver or spleen organomegaly  Genitourinary: no suprapubic tenderness  Musculoskeletal: no muscle tenderness, no joint swelling or tenderness  Neurological/ Psychiatric: AxOx3, judgement and insight normal;  moving all extremities  Skin: no rashes; no palpable lesions    Labs: all available labs reviewed                        14.4   15.41 )-----------( Clumped    ( 02 May 2022 08:49 )             44.8     05-    138  |  102  |  13  ----------------------------<  109<H>  3.9   |  28  |  0.92    Ca    8.6      02 May 2022 08:49  Mg     2.6         TPro  8.2  /  Alb  2.6<L>  /  TBili  0.6  /  DBili  x   /  AST  46<H>  /  ALT  73  /  AlkPhos  74       LIVER FUNCTIONS - ( 02 May 2022 08:49 )  Alb: 2.6 g/dL / Pro: 8.2 gm/dL / ALK PHOS: 74 U/L / ALT: 73 U/L / AST: 46 U/L / GGT: x                 < from: CT Angio Chest PE Protocol w/ IV Cont (22 @ 18:43) >    ACC: [id number] EXAM:  CT ANGIO CHEST PULM [facility]                          PROCEDURE DATE:  2022          INTERPRETATION:  Clinical indication: Shortness of breath, right lower   lobe infiltrate.    CT angiogram of the chest was performed following intravenous   administration of 90 cc of Omnipaque-350. 10 cc of contrast was   discarded. MIP images are submitted.    No prior chest CTs are available for comparison.    No pulmonary arterial emboli.    No enlarged axillary lymph nodes. A few right supraclavicular or   retroclavicular lymph nodes with the largest mildly enlarged lymph node   measuring up to about 1.5 cm on image 10 of series 2.    Multiple mediastinal small and enlarged lymph nodes with the largest in   the subcarinal location measuring about 1.8 cm x 3.3 cm as measured on   the axial images. Ill-defined right hilar hypodense opacity surrounding   the right lower lung bronchovascular bundles, part of which measures   about 4.1 x 5 cm also likely represent lymphadenopathy. Smaller left   hilar lymph node is noted. Small and mildly enlarged lymph node adjacent   to the lower portion of the esophagus.    Evaluation of the upper abdomen demonstrate cholelithiasis. A few mildly   enlarged lymph node in the portacaval location largest measuring about   1.3 x 2.1 cm.    Trace bilateral pleural effusions, left greater than right.    Evaluation of the lungs demonstrate emphysema. Mild bilateral lower lung   areas of linear or subsegmental atelectasis, left greater than right.    Dominant 1.7 cm right lower lobe pulmonary nodule on image 86 of series 2.  5 mm right upper lobe pulmonary nodule on image 30 of series 2.    Trace secretions within the trachea extending into the right mainstem   bronchus.    Degenerative changes of the spine. Mild superior endplate loss of height   of the L1 vertebral body.    IMPRESSION: No pulmonary arterial emboli.    1.7 cm right lower lobe pulmonary nodule associated with right hilar and   mediastinal lymphadenopathy worrisome for primary lung neoplasm with   metastatic disease.    Multiple right supraclavicular and retroclavicular lymph nodes may also   represent metastatic disease as described above. These lymph nodes are   amenable to percutaneous ultrasound-guided biopsy.    Nonspecific 5 mm right upper lobe pulmonary nodule.    A few mildly enlarged portacaval lymph nodes are nonspecific.    PET scan can be performed for complete evaluation.    < end of copied text >              < end of copied text >            Radiology: all available radiological tests reviewed    Advanced directives addressed: full resuscitation

## 2022-05-02 NOTE — CONSULT NOTE ADULT - SUBJECTIVE AND OBJECTIVE BOX
HPI: MEENA PEREZ is a 62y old Male with PMHx of prostate cancer, b/l knee replacement who presented on 4/30 complaining of SOB and chest pain that started over the weekend. The chest pain is non radiating but it is associated with WILEY with minimal movements and SOB all the time. If he tries to take deep breaths he gets chest pain and worsening SOB.  He also had 100.5 fever and productive cough which at the beginning was green but for the past couple of days has been clear.  He has never experienced something similar.    He denies any dizziness, lightheadedness, headache, nausea, vomiting. He was treated for Prostate cancer 7 years ago, has not had any recent follow up.    In the ED patient was found to have WBC 20.35, D-dimer 2456, Lactate 1.4, and was given Azithromycin 500mg x1 and Rocephine 1000mg x1  He had a CT PE which revealed no PE, but lympadenopathy concerning for metastatic disease. Heme onc was consulted. We discussed the next steps in management    Past Medical History:   Prostate CA      Past Surgical History:   History of total bilateral knee replacement (TKR)      Family History:    Family history of thyroid cancer (Mother)    Family history of lung cancer (Father)       Social History: nonsmoker    Review of Systems:  All 10 systems reviewed in detailed and found to be negative with the exception of what has already been described above    Allergies:  tetracyclines (Unknown)    Meds  MEDICATIONS  (STANDING):  azithromycin   Tablet 500 milliGRAM(s) Oral daily  cefTRIAXone   IVPB 1000 milliGRAM(s) IV Intermittent every 24 hours  enoxaparin Injectable 40 milliGRAM(s) SubCutaneous every 12 hours    MEDICATIONS  (PRN):    Physical Exam  T(C): 36.9 (05-02-22 @ 07:39), Max: 36.9 (05-01-22 @ 23:00)  HR: 80 (05-02-22 @ 07:39) (80 - 83)  BP: 137/75 (05-02-22 @ 07:39) (119/60 - 145/76)  RR: 20 (05-02-22 @ 07:39) (18 - 20)  SpO2: 97% (05-02-22 @ 07:39) (96% - 97%)  Gen: Alert, oriented, no distress  HEENT: Anicteric sclera, moist mucous membranes, no JVD, no lymphadenopathy  Cardio: Regular rhythm and rate, normal S1S2, no murmurs  Resp: Clear to auscultation bilaterally, no wheezing or rhonchi  GI: Nontender, nondistended, normoactive bowel sounds  Ext: No cyanosis, clubbing or edema  Neuro: Nonfocal    Labs:                        14.4   15.41 )-----------( Clumped    ( 02 May 2022 08:49 )             44.8     05-02    138  |  102  |  13  ----------------------------<  109<H>  3.9   |  28  |  0.92    Ca    8.6      02 May 2022 08:49    TPro  8.2  /  Alb  2.6<L>  /  TBili  0.6  /  DBili  x   /  AST  46<H>  /  ALT  73  /  AlkPhos  74  05-02    PTT - ( 01 May 2022 11:22 )  PTT:71.8 sec    CT Scan: personally reviewed  < from: CT Abdomen and Pelvis w/ IV Cont (05.02.22 @ 09:16) >  PROCEDURE DATE:  05/02/2022          INTERPRETATION:  CLINICAL INFORMATION: Metastatic lung disease, prostate   cancer    COMPARISON: 4/30/2022    CONTRAST/COMPLICATIONS:  IV Contrast: Omnipaque 350  90 cc administered   10 cc discarded  Oral Contrast: NONE  Complications: None reported at time of study completion    PROCEDURE:  CT of the Abdomen and Pelvis was performed.  Sagittal and coronal reformats were performed.    FINDINGS:  LOWER CHEST: Masslike opacity surrounding the inferior right hilum and   right lower lobe nodule. Trace left pleural effusion with associated   atelectasis    LIVER: Within normal limits.  BILE DUCTS: Normal caliber.  GALLBLADDER: Cholelithiasis.  SPLEEN: Mildly enlarged.  PANCREAS: Within normal limits.  ADRENALS: Within normal limits.  KIDNEYS/URETERS: Subcentimeter hypodensity in the midpole of the right   kidney with an associated punctate calcification. No hydronephrosis.    BLADDER:Within normal limits.  REPRODUCTIVE ORGANS: Prostatectomy.    BOWEL: No bowel obstruction. Mildly thickened fluid-filled, distal small   bowel loops. Appendix is normal. Colonic diverticulosis. Large amount   stool within the rectum.  PERITONEUM: No ascites.  VESSELS: Atherosclerotic changes.  RETROPERITONEUM/LYMPH NODES: No lymphadenopathy.  ABDOMINAL WALL: Small fat-containing left inguinal hernia. Partially   visualized fatty mass within the right hip adductor musculature.  BONES: Mild compression deformity of L1 of indeterminate age. Old right   inferior rib fractures    IMPRESSION:  Mildly thickened fluid-filled distal small bowel loops suggestive of   enteritis. No bowel obstruction.    Multiple lung findings, better visualized on recent CT of the chest.    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 However, correlate with prior outside   imaging.    Mild splenomegaly.    Additional findings as above.    < end of copied text >   HPI: MEENA PEREZ is a 62y old Male with PMHx of prostate cancer, b/l knee replacement who presented on 4/30 complaining of SOB and chest pain that started over the weekend. The chest pain is non radiating but it is associated with WILEY with minimal movements and SOB all the time. If he tries to take deep breaths he gets chest pain and worsening SOB.  He also had 100.5 fever and productive cough which at the beginning was green but for the past couple of days has been clear.  He has never experienced something similar.    He denies any dizziness, lightheadedness, headache, nausea, vomiting. He was treated for Prostate cancer 7 years ago, has not had any recent follow up.    In the ED patient was found to have WBC 20.35, D-dimer 2456, Lactate 1.4, and was given Azithromycin 500mg x1 and Rocephine 1000mg x1  He had a CT PE which revealed no PE, but lympadenopathy concerning for metastatic disease. Heme onc was consulted. We discussed the next steps in management    Past Medical History:   Prostate CA      Past Surgical History:   History of total bilateral knee replacement (TKR)      Family History:    Family history of thyroid cancer (Mother)    Family history of lung cancer (Father)       Social History: nonsmoker    Review of Systems:  All 10 systems reviewed in detailed and found to be negative with the exception of what has already been described above    Allergies:  tetracyclines (Unknown)    Meds  MEDICATIONS  (STANDING):  azithromycin   Tablet 500 milliGRAM(s) Oral daily  cefTRIAXone   IVPB 1000 milliGRAM(s) IV Intermittent every 24 hours  enoxaparin Injectable 40 milliGRAM(s) SubCutaneous every 12 hours    MEDICATIONS  (PRN):    Physical Exam  T(C): 36.9 (05-02-22 @ 07:39), Max: 36.9 (05-01-22 @ 23:00)  HR: 80 (05-02-22 @ 07:39) (80 - 83)  BP: 137/75 (05-02-22 @ 07:39) (119/60 - 145/76)  RR: 20 (05-02-22 @ 07:39) (18 - 20)  SpO2: 97% (05-02-22 @ 07:39) (96% - 97%)  Gen: Alert, oriented, no distress  HEENT: Anicteric sclera, moist mucous membranes, no JVD, no lymphadenopathy  Cardio: Regular rhythm and rate, normal S1S2, no murmurs  Resp: Clear to auscultation bilaterally, no wheezing or rhonchi  GI: Nontender, nondistended, normoactive bowel sounds  Ext: No cyanosis, clubbing or edema  Neuro: Nonfocal    Labs:                        14.4   15.41 )-----------( Clumped    ( 02 May 2022 08:49 )             44.8     05-02    138  |  102  |  13  ----------------------------<  109<H>  3.9   |  28  |  0.92    Ca    8.6      02 May 2022 08:49    TPro  8.2  /  Alb  2.6<L>  /  TBili  0.6  /  DBili  x   /  AST  46<H>  /  ALT  73  /  AlkPhos  74  05-02    PTT - ( 01 May 2022 11:22 )  PTT:71.8 sec    CT Scan: personally reviewed  < from: CT Abdomen and Pelvis w/ IV Cont (05.02.22 @ 09:16) >  PROCEDURE DATE:  05/02/2022          INTERPRETATION:  CLINICAL INFORMATION: Metastatic lung disease, prostate   cancer    COMPARISON: 4/30/2022    CONTRAST/COMPLICATIONS:  IV Contrast: Omnipaque 350  90 cc administered   10 cc discarded  Oral Contrast: NONE  Complications: None reported at time of study completion    PROCEDURE:  CT of the Abdomen and Pelvis was performed.  Sagittal and coronal reformats were performed.    FINDINGS:  LOWER CHEST: Masslike opacity surrounding the inferior right hilum and   right lower lobe nodule. Trace left pleural effusion with associated   atelectasis    LIVER: Within normal limits.  BILE DUCTS: Normal caliber.  GALLBLADDER: Cholelithiasis.  SPLEEN: Mildly enlarged.  PANCREAS: Within normal limits.  ADRENALS: Within normal limits.  KIDNEYS/URETERS: Subcentimeter hypodensity in the midpole of the right   kidney with an associated punctate calcification. No hydronephrosis.    BLADDER:Within normal limits.  REPRODUCTIVE ORGANS: Prostatectomy.    BOWEL: No bowel obstruction. Mildly thickened fluid-filled, distal small   bowel loops. Appendix is normal. Colonic diverticulosis. Large amount   stool within the rectum.  PERITONEUM: No ascites.  VESSELS: Atherosclerotic changes.  RETROPERITONEUM/LYMPH NODES: No lymphadenopathy.  ABDOMINAL WALL: Small fat-containing left inguinal hernia. Partially   visualized fatty mass within the right hip adductor musculature.  BONES: Mild compression deformity of L1 of indeterminate age. Old right   inferior rib fractures    IMPRESSION:  Mildly thickened fluid-filled distal small bowel loops suggestive of   enteritis. No bowel obstruction.    Multiple lung findings, better visualized on recent CT of the chest.    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 However, correlate with prior outside   imaging.    Mild splenomegaly.    Additional findings as above.    < from: CT Angio Chest PE Protocol w/ IV Cont (04.30.22 @ 18:43) >  PROCEDURE DATE:  04/30/2022          INTERPRETATION:  Clinical indication: Shortness of breath, right lower   lobe infiltrate.    CT angiogram of the chest was performed following intravenous   administration of 90 cc of Omnipaque-350. 10 cc of contrast was   discarded. MIP images are submitted.    No prior chest CTs are available for comparison.    No pulmonary arterial emboli.    No enlarged axillary lymph nodes. A few right supraclavicular or   retroclavicular lymph nodes with the largest mildly enlarged lymph node   measuring up to about 1.5 cm on image 10 of series 2.    Multiple mediastinal small and enlarged lymph nodes with the largest in   the subcarinal location measuring about 1.8 cm x 3.3 cm as measured on   the axial images. Ill-defined right hilar hypodense opacity surrounding   the right lower lung bronchovascular bundles, part of which measures   about 4.1 x 5 cm also likely represent lymphadenopathy. Smaller left   hilar lymph node is noted. Small and mildly enlarged lymph node adjacent   to the lower portion of the esophagus.    Evaluation of the upper abdomen demonstrate cholelithiasis. A few mildly   enlarged lymph node in the portacaval location largest measuring about   1.3 x 2.1 cm.    Trace bilateral pleural effusions, left greater than right.    Evaluation of the lungs demonstrate emphysema. Mild bilateral lower lung   areas of linear or subsegmental atelectasis, left greater than right.    Dominant 1.7 cm right lower lobe pulmonary nodule on image 86 of series 2.  5 mm right upper lobe pulmonary nodule on image 30 of series 2.    Trace secretions within the trachea extending into the right mainstem   bronchus.    Degenerative changes of the spine. Mild superior endplate loss of height   of the L1 vertebral body.    IMPRESSION: No pulmonary arterial emboli.    1.7 cm right lower lobe pulmonary nodule associated with right hilar and   mediastinal lymphadenopathy worrisome for primary lung neoplasm with   metastatic disease.    Multiple right supraclavicular and retroclavicular lymph nodes may also   represent metastatic disease as described above. These lymph nodes are   amenable to percutaneous ultrasound-guided biopsy.    Nonspecific 5 mm right upper lobe pulmonary nodule.    A few mildly enlarged portacaval lymph nodes are nonspecific.    PET scan can be performed for complete evaluation.    < end of copied text >

## 2022-05-02 NOTE — CONSULT NOTE ADULT - ASSESSMENT
62y old Male with PMHx of prostate cancer, b/l knee replacement with SOB        azithromycin   Tablet 500 milliGRAM(s) Oral daily  cefTRIAXone   IVPB 1000 milliGRAM(s) IV Intermittent every 24 hours  enoxaparin Injectable 40 milliGRAM(s) SubCutaneous every 12 hours       62y old Male with PMHx of prostate cancer, b/l knee replacement with SOB with hilar mass and pneumonia  1. Pneumonia, hilar mass, lymphadenopathy: At this time it is difficult to determine if these findings suggest pneumonia or malignancy. With antibiotic treatment he may have some of these lesions improve/resolve. I do suspect that he would have residual findings after treatment of his pneumonia, which would likely represent malignancy. Given the fact that he is being treated with antibiotics, suggest repeat CT in 2-3 weeks and then re evaluation of potential sites for biopsy  -continue ceftriaxone and azithromycin for now    spoke to primary team

## 2022-05-02 NOTE — CONSULT NOTE ADULT - ASSESSMENT
63yo male with PMHx of prostate cancer, b/l knee replacement admitted on 4/30 for evaluation of ongoing chest pain associated with shortness of breath, especially when taking deep breaths, associated with fever to 100.5, productive of cough initially green now clear, had recent evaluation in ED after fall backwards when he lost his balance. No recent follow up for the prostate cancer.     1. Patient admitted with pneumonia, possibly smoldering malignancy  - follow up cultures   - serial cbc and monitor temperature   - reviewed prior medical records to evaluate for resistant or atypical pathogens   - iv hydration and supportive care   - oxygen and nebs as needed   - will continue ceftriaxone as ordered to cover for community acquired pneumonia  - on zithromax for atypical pathogens  - consideration for IR aspiration of the pulmonary nodes/mass like lesion  2. other issues: per medicine

## 2022-05-03 ENCOUNTER — RESULT REVIEW (OUTPATIENT)
Age: 62
End: 2022-05-03

## 2022-05-03 LAB
ALBUMIN SERPL ELPH-MCNC: 2.4 G/DL — LOW (ref 3.3–5)
ALP SERPL-CCNC: 66 U/L — SIGNIFICANT CHANGE UP (ref 40–120)
ALT FLD-CCNC: 66 U/L — SIGNIFICANT CHANGE UP (ref 12–78)
ANION GAP SERPL CALC-SCNC: 3 MMOL/L — LOW (ref 5–17)
APPEARANCE UR: ABNORMAL
AST SERPL-CCNC: 43 U/L — HIGH (ref 15–37)
BILIRUB SERPL-MCNC: 0.5 MG/DL — SIGNIFICANT CHANGE UP (ref 0.2–1.2)
BILIRUB UR-MCNC: NEGATIVE — SIGNIFICANT CHANGE UP
BUN SERPL-MCNC: 11 MG/DL — SIGNIFICANT CHANGE UP (ref 7–23)
CALCIUM SERPL-MCNC: 8.4 MG/DL — LOW (ref 8.5–10.1)
CHLORIDE SERPL-SCNC: 104 MMOL/L — SIGNIFICANT CHANGE UP (ref 96–108)
CO2 SERPL-SCNC: 29 MMOL/L — SIGNIFICANT CHANGE UP (ref 22–31)
COLOR SPEC: ABNORMAL
CREAT SERPL-MCNC: 0.85 MG/DL — SIGNIFICANT CHANGE UP (ref 0.5–1.3)
DIFF PNL FLD: ABNORMAL
EGFR: 98 ML/MIN/1.73M2 — SIGNIFICANT CHANGE UP
GLUCOSE SERPL-MCNC: 114 MG/DL — HIGH (ref 70–99)
GLUCOSE UR QL: NEGATIVE — SIGNIFICANT CHANGE UP
HCT VFR BLD CALC: 43.3 % — SIGNIFICANT CHANGE UP (ref 39–50)
HGB BLD-MCNC: 14.2 G/DL — SIGNIFICANT CHANGE UP (ref 13–17)
KETONES UR-MCNC: NEGATIVE — SIGNIFICANT CHANGE UP
LEUKOCYTE ESTERASE UR-ACNC: ABNORMAL
MCHC RBC-ENTMCNC: 32.2 PG — SIGNIFICANT CHANGE UP (ref 27–34)
MCHC RBC-ENTMCNC: 32.8 GM/DL — SIGNIFICANT CHANGE UP (ref 32–36)
MCV RBC AUTO: 98.2 FL — SIGNIFICANT CHANGE UP (ref 80–100)
NITRITE UR-MCNC: NEGATIVE — SIGNIFICANT CHANGE UP
PH UR: 5 — SIGNIFICANT CHANGE UP (ref 5–8)
PLATELET # BLD AUTO: 151 K/UL — SIGNIFICANT CHANGE UP (ref 150–400)
POTASSIUM SERPL-MCNC: 4.1 MMOL/L — SIGNIFICANT CHANGE UP (ref 3.5–5.3)
POTASSIUM SERPL-SCNC: 4.1 MMOL/L — SIGNIFICANT CHANGE UP (ref 3.5–5.3)
PROT SERPL-MCNC: 7.7 GM/DL — SIGNIFICANT CHANGE UP (ref 6–8.3)
PROT UR-MCNC: 30 MG/DL
RBC # BLD: 4.41 M/UL — SIGNIFICANT CHANGE UP (ref 4.2–5.8)
RBC # FLD: 14.3 % — SIGNIFICANT CHANGE UP (ref 10.3–14.5)
SODIUM SERPL-SCNC: 136 MMOL/L — SIGNIFICANT CHANGE UP (ref 135–145)
SP GR SPEC: 1.02 — SIGNIFICANT CHANGE UP (ref 1.01–1.02)
UROBILINOGEN FLD QL: NEGATIVE — SIGNIFICANT CHANGE UP
WBC # BLD: 12.29 K/UL — HIGH (ref 3.8–10.5)
WBC # FLD AUTO: 12.29 K/UL — HIGH (ref 3.8–10.5)

## 2022-05-03 PROCEDURE — 76942 ECHO GUIDE FOR BIOPSY: CPT | Mod: 26

## 2022-05-03 PROCEDURE — 99233 SBSQ HOSP IP/OBS HIGH 50: CPT | Mod: GC

## 2022-05-03 PROCEDURE — 38505 NEEDLE BIOPSY LYMPH NODES: CPT

## 2022-05-03 PROCEDURE — 88307 TISSUE EXAM BY PATHOLOGIST: CPT | Mod: 26

## 2022-05-03 PROCEDURE — 99222 1ST HOSP IP/OBS MODERATE 55: CPT

## 2022-05-03 RX ADMIN — AZITHROMYCIN 500 MILLIGRAM(S): 500 TABLET, FILM COATED ORAL at 11:09

## 2022-05-03 RX ADMIN — CEFTRIAXONE 100 MILLIGRAM(S): 500 INJECTION, POWDER, FOR SOLUTION INTRAMUSCULAR; INTRAVENOUS at 22:08

## 2022-05-03 RX ADMIN — ENOXAPARIN SODIUM 40 MILLIGRAM(S): 100 INJECTION SUBCUTANEOUS at 22:08

## 2022-05-03 NOTE — PROCEDURE NOTE - PLAN
Lymph node biopsy specimen confirmed by cytopathology. Patient has underlying pneumonia in addition to concern for malignancy  If biopsy specimen is negative, patient should still continue malignancy workup (patient scheduled for bronchoscopic biopsy in the future)  Follow up PET/CT can be obtained to dictate if lymph node biopsy needs to be repeated  Discussed with primary attending Dr Karel Wallace at 3:33pm

## 2022-05-03 NOTE — PROGRESS NOTE ADULT - SUBJECTIVE AND OBJECTIVE BOX
Subjective:  discussed case with dr nieves, plan for supraclavicular node biopsy  breathing the same  discussed with patient, he became upset as it reminded him of his mother having a biopsy    Review of Systems:  All 10 systems reviewed in detailed and found to be negative with the exception of what has already been described above    Allergies:  tetracyclines (Unknown)    Meds  MEDICATIONS  (STANDING):  azithromycin   Tablet 500 milliGRAM(s) Oral daily  cefTRIAXone   IVPB 1000 milliGRAM(s) IV Intermittent every 24 hours  enoxaparin Injectable 40 milliGRAM(s) SubCutaneous every 12 hours    MEDICATIONS  (PRN):    Physical Exam  T(C): 36.8 (22 @ 07:44), Max: 36.9 (22 @ 23:24)  HR: 75 (22 @ :44) (75 - 80)  BP: 123/71 (22 @ 07:44) (122/63 - 125/72)  RR: 18 (22:44) (18 - 20)  SpO2: 95% (22 @ 07:44) (94% - 97%)  Gen: Alert, oriented, no distress  HEENT: Anicteric sclera, moist mucous membranes, no JVD, no lymphadenopathy  Cardio: Regular rhythm and rate, normal S1S2, no murmurs  Resp: Clear to auscultation bilaterally, no wheezing or rhonchi  GI: Nontender, nondistended, normoactive bowel sounds  Ext: No cyanosis, clubbing or edema  Neuro: Nonfocal    Labs:                        14.2   12.29 )-----------( 151      ( 03 May 2022 08:22 )             43.3         136  |  104  |  11  ----------------------------<  114<H>  4.1   |  29  |  0.85    Ca    8.4<L>      03 May 2022 08:22    TPro  7.7  /  Alb  2.4<L>  /  TBili  0.5  /  DBili  x   /  AST  43<H>  /  ALT  66  /  AlkPhos  66  05-03      Urinalysis Basic - ( 03 May 2022 02:07 )    Color: Laurita / Appearance: Slightly Turbid / S.025 / pH: x  Gluc: x / Ketone: Negative  / Bili: Negative / Urobili: Negative   Blood: x / Protein: 30 mg/dL / Nitrite: Negative   Leuk Esterase: Trace / RBC: 3-5 /HPF / WBC 3-5   Sq Epi: x / Non Sq Epi: Negative / Bacteria: Few    CT Scan: personally reviewed  < from: CT Abdomen and Pelvis w/ IV Cont (22 @ 09:16) >  PROCEDURE DATE:  2022          INTERPRETATION:  CLINICAL INFORMATION: Metastatic lung disease, prostate   cancer    COMPARISON: 2022    CONTRAST/COMPLICATIONS:  IV Contrast: Omnipaque 350  90 cc administered   10 cc discarded  Oral Contrast: NONE  Complications: None reported at time of study completion    PROCEDURE:  CT of the Abdomen and Pelvis was performed.  Sagittal and coronal reformats were performed.    FINDINGS:  LOWER CHEST: Masslike opacity surrounding the inferior right hilum and   right lower lobe nodule. Trace left pleural effusion with associated   atelectasis    LIVER: Within normal limits.  BILE DUCTS: Normal caliber.  GALLBLADDER: Cholelithiasis.  SPLEEN: Mildly enlarged.  PANCREAS: Within normal limits.  ADRENALS: Within normal limits.  KIDNEYS/URETERS: Subcentimeter hypodensity in the midpole of the right   kidney with an associated punctate calcification. No hydronephrosis.    BLADDER:Within normal limits.  REPRODUCTIVE ORGANS: Prostatectomy.    BOWEL: No bowel obstruction. Mildly thickened fluid-filled, distal small   bowel loops. Appendix is normal. Colonic diverticulosis. Large amount   stool within the rectum.  PERITONEUM: No ascites.  VESSELS: Atherosclerotic changes.  RETROPERITONEUM/LYMPH NODES: No lymphadenopathy.  ABDOMINAL WALL: Small fat-containing left inguinal hernia. Partially   visualized fatty mass within the right hip adductor musculature.  BONES: Mild compression deformity of L1 of indeterminate age. Old right   inferior rib fractures    IMPRESSION:  Mildly thickened fluid-filled distal small bowel loops suggestive of   enteritis. No bowel obstruction.    Multiple lung findings, better visualized on recent CT of the chest.    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 However, correlate with prior outside   imaging.    Mild splenomegaly.    Additional findings as above.    < from: CT Angio Chest PE Protocol w/ IV Cont (22 @ 18:43) >  PROCEDURE DATE:  2022          INTERPRETATION:  Clinical indication: Shortness of breath, right lower   lobe infiltrate.    CT angiogram of the chest was performed following intravenous   administration of 90 cc of Omnipaque-350. 10 cc of contrast was   discarded. MIP images are submitted.    No prior chest CTs are available for comparison.    No pulmonary arterial emboli.    No enlarged axillary lymph nodes. A few right supraclavicular or   retroclavicular lymph nodes with the largest mildly enlarged lymph node   measuring up to about 1.5 cm on image 10 of series 2.    Multiple mediastinal small and enlarged lymph nodes with the largest in   the subcarinal location measuring about 1.8 cm x 3.3 cm as measured on   the axial images. Ill-defined right hilar hypodense opacity surrounding   the right lower lung bronchovascular bundles, part of which measures   about 4.1 x 5 cm also likely represent lymphadenopathy. Smaller left   hilar lymph node is noted. Small and mildly enlarged lymph node adjacent   to the lower portion of the esophagus.    Evaluation of the upper abdomen demonstrate cholelithiasis. A few mildly   enlarged lymph node in the portacaval location largest measuring about   1.3 x 2.1 cm.    Trace bilateral pleural effusions, left greater than right.    Evaluation of the lungs demonstrate emphysema. Mild bilateral lower lung   areas of linear or subsegmental atelectasis, left greater than right.    Dominant 1.7 cm right lower lobe pulmonary nodule on image 86 of series 2.  5 mm right upper lobe pulmonary nodule on image 30 of series 2.    Trace secretions within the trachea extending into the right mainstem   bronchus.    Degenerative changes of the spine. Mild superior endplate loss of height   of the L1 vertebral body.    IMPRESSION: No pulmonary arterial emboli.    1.7 cm right lower lobe pulmonary nodule associated with right hilar and   mediastinal lymphadenopathy worrisome for primary lung neoplasm with   metastatic disease.    Multiple right supraclavicular and retroclavicular lymph nodes may also   represent metastatic disease as described above. These lymph nodes are   amenable to percutaneous ultrasound-guided biopsy.    Nonspecific 5 mm right upper lobe pulmonary nodule.    A few mildly enlarged portacaval lymph nodes are nonspecific.    PET scan can be performed for complete evaluation.

## 2022-05-03 NOTE — PROGRESS NOTE ADULT - SUBJECTIVE AND OBJECTIVE BOX
Date of service: 05-03-22 @ 10:17    Patient lying in bed; afebrile, still on oxygen via nasal cannula, mild cough; anxious about the lymph nodes seen on imaging        ROS: no fever or chills; denies dizziness, no HA, no SOB or cough, no abdominal pain, no diarrhea or constipation; no dysuria, no urinary frequency, no legs pain, no rashes    MEDICATIONS  (STANDING):  azithromycin   Tablet 500 milliGRAM(s) Oral daily  cefTRIAXone   IVPB 1000 milliGRAM(s) IV Intermittent every 24 hours  enoxaparin Injectable 40 milliGRAM(s) SubCutaneous every 12 hours    MEDICATIONS  (PRN):      Vital Signs Last 24 Hrs  T(C): 36.8 (03 May 2022 07:44), Max: 36.9 (02 May 2022 23:24)  T(F): 98.2 (03 May 2022 07:44), Max: 98.4 (02 May 2022 23:24)  HR: 75 (03 May 2022 07:44) (75 - 80)  BP: 123/71 (03 May 2022 07:44) (122/63 - 125/72)  BP(mean): --  RR: 18 (03 May 2022 07:44) (18 - 20)  SpO2: 95% (03 May 2022 07:44) (94% - 97%)        Physical Exam:            Constitutional: frail looking  HEENT: NC/AT, EOMI, PERRLA, conjunctivae clear; ears and nose atraumatic; pharynx clear  Neck: supple; thyroid not palpable  Back: no tenderness  Respiratory: respiratory effort normal; clear to auscultation  Cardiovascular: S1S2 regular, no murmurs  Abdomen: soft, not tender, not distended, positive BS; no liver or spleen organomegaly  Genitourinary: no suprapubic tenderness  Musculoskeletal: no muscle tenderness, no joint swelling or tenderness  Neurological/ Psychiatric: AxOx3, judgement and insight normal;  moving all extremities  Skin: no rashes; no palpable lesions    Labs: all available labs reviewed             Labs:                        14.2   12.29 )-----------( 151      ( 03 May 2022 08:22 )             43.3     05-03    136  |  104  |  11  ----------------------------<  114<H>  4.1   |  29  |  0.85    Ca    8.4<L>      03 May 2022 08:22    TPro  7.7  /  Alb  2.4<L>  /  TBili  0.5  /  DBili  x   /  AST  43<H>  /  ALT  66  /  AlkPhos  66  05-03           Cultures:       Culture - Blood (collected 04-30-22 @ 14:42)  Source: .Blood None  Preliminary Report (05-01-22 @ 19:01):    No growth to date.    Culture - Blood (collected 04-30-22 @ 14:27)  Source: .Blood Blood-Peripheral  Preliminary Report (05-01-22 @ 19:01):    No growth to date.      D-Dimer Assay, Quantitative: 2456 ng/mL DDU (04-30-22 @ 14:42)          < from: CT Angio Chest PE Protocol w/ IV Cont (04.30.22 @ 18:43) >    ACC: [id number] EXAM:  CT ANGIO CHEST PULM ART [facility]                          PROCEDURE DATE:  04/30/2022          INTERPRETATION:  Clinical indication: Shortness of breath, right lower   lobe infiltrate.    CT angiogram of the chest was performed following intravenous   administration of 90 cc of Omnipaque-350. 10 cc of contrast was   discarded. MIP images are submitted.    No prior chest CTs are available for comparison.    No pulmonary arterial emboli.    No enlarged axillary lymph nodes. A few right supraclavicular or   retroclavicular lymph nodes with the largest mildly enlarged lymph node   measuring up to about 1.5 cm on image 10 of series 2.    Multiple mediastinal small and enlarged lymph nodes with the largest in   the subcarinal location measuring about 1.8 cm x 3.3 cm as measured on   the axial images. Ill-defined right hilar hypodense opacity surrounding   the right lower lung bronchovascular bundles, part of which measures   about 4.1 x 5 cm also likely represent lymphadenopathy. Smaller left   hilar lymph node is noted. Small and mildly enlarged lymph node adjacent   to the lower portion of the esophagus.    Evaluation of the upper abdomen demonstrate cholelithiasis. A few mildly   enlarged lymph node in the portacaval location largest measuring about   1.3 x 2.1 cm.    Trace bilateral pleural effusions, left greater than right.    Evaluation of the lungs demonstrate emphysema. Mild bilateral lower lung   areas of linear or subsegmental atelectasis, left greater than right.    Dominant 1.7 cm right lower lobe pulmonary nodule on image 86 of series 2.  5 mm right upper lobe pulmonary nodule on image 30 of series 2.    Trace secretions within the trachea extending into the right mainstem   bronchus.    Degenerative changes of the spine. Mild superior endplate loss of height   of the L1 vertebral body.    IMPRESSION: No pulmonary arterial emboli.    1.7 cm right lower lobe pulmonary nodule associated with right hilar and   mediastinal lymphadenopathy worrisome for primary lung neoplasm with   metastatic disease.    Multiple right supraclavicular and retroclavicular lymph nodes may also   represent metastatic disease as described above. These lymph nodes are   amenable to percutaneous ultrasound-guided biopsy.    Nonspecific 5 mm right upper lobe pulmonary nodule.    A few mildly enlarged portacaval lymph nodes are nonspecific.    PET scan can be performed for complete evaluation.    < end of copied text >              < end of copied text >            Radiology: all available radiological tests reviewed    Advanced directives addressed: full resuscitation

## 2022-05-03 NOTE — CONSULT NOTE ADULT - ASSESSMENT
62y Male PMHx of prostate cancer treated w RT 7 years ago, b/l knee replacement admitted w c/o SOB dx w PNA on 4/30/22. CT findings suspicious for underlying possible malignancy. Called to consult.   S/p IR biopsy of supraclavicular LN 5/3/22    cont treatment for PNA as per ID  f/u pathology from IR biopsy  outpt PET scan  If more tissue needed, could arrange for FB and EBUS  care as per medical/oncology services X Size Of Lesion In Cm: 0

## 2022-05-03 NOTE — PROGRESS NOTE ADULT - SUBJECTIVE AND OBJECTIVE BOX
INTERVAL HPI/OVERNIGHT EVENTS:  Patient S&E at bedside. No o/n events, pt remains on 2L NC. Requesting to shower. Eating food at bedside. We discussed option of biopsy with patient as well as Dr. Mosley and Dr. Wallace. IR consult placed. Imaging reviewed with patient as well. VSS. Labs reviewed, WBC trending down to 12.29 today, plt 151k.      PAST MEDICAL & SURGICAL HISTORY:  Prostate CA    History of total bilateral knee replacement (TKR)        FAMILY HISTORY:  Family history of thyroid cancer (Mother)    Family history of lung cancer (Father)  had mesothelioma,   in his 90s        VITAL SIGNS:  T(F): 97.5 (22 @ 13:53)  HR: 89 (22 @ 13:53)  BP: 130/75 (22 @ 13:53)  RR: 18 (22 @ 13:53)  SpO2: 93% (22 @ 13:53)  Wt(kg): --    PHYSICAL EXAM:    Constitutional: NAD  Eyes: EOMI,  Respiratory:rhonchi, on 2L NC  Cardiovascular: RRR,  Gastrointestinal: soft, NTND,   Extremities: no c/c/e  Neurological: AAOx3      MEDICATIONS  (STANDING):  azithromycin   Tablet 500 milliGRAM(s) Oral daily  cefTRIAXone   IVPB 1000 milliGRAM(s) IV Intermittent every 24 hours  enoxaparin Injectable 40 milliGRAM(s) SubCutaneous every 12 hours    MEDICATIONS  (PRN):      Allergies    tetracyclines (Unknown)    Intolerances        LABS:                        14.2   12.29 )-----------( 151      ( 03 May 2022 08:22 )             43.3         136  |  104  |  11  ----------------------------<  114<H>  4.1   |  29  |  0.85    Ca    8.4<L>      03 May 2022 08:22    TPro  7.7  /  Alb  2.4<L>  /  TBili  0.5  /  DBili  x   /  AST  43<H>  /  ALT  66  /  AlkPhos  66  05-03      Urinalysis Basic - ( 03 May 2022 02:07 )    Color: Laurita / Appearance: Slightly Turbid / S.025 / pH: x  Gluc: x / Ketone: Negative  / Bili: Negative / Urobili: Negative   Blood: x / Protein: 30 mg/dL / Nitrite: Negative   Leuk Esterase: Trace / RBC: 3-5 /HPF / WBC 3-5   Sq Epi: x / Non Sq Epi: Negative / Bacteria: Few        RADIOLOGY & ADDITIONAL TESTS:  Studies reviewed.

## 2022-05-03 NOTE — PROGRESS NOTE ADULT - SUBJECTIVE AND OBJECTIVE BOX
cc - dyspnea, cough   61yo male with PMHx of prostate cancer, b/l knee replacement here complaining of SOB and chest pain that started on the weekend.   Patient mentions his chest pain is non radiating but it is associated with WILEY with minimal movements and SOB all the time. If he tries to take deep breaths he gets chest pain and worsen SOB.  Patient mentions when he started he also had 100.5 fever and has been having productive cough at the beginning was green but for the past couple of days has been clear.  He has never experienced something similar.    Patient was recently seen in the ED on 4/26 after losing his balance and falling backwards.  He did hit his head at that time, but did not lose consciousness.  Patient denies any dizziness, lightheadedness, headache, nausea, vomiting.   On further questioning,  pt admitted to loss of appetite over the last eight days.  He mentioned WILEY ongoing for more than six months.   He was treated for Prostate cancer 7 years ago, has not had any recent follow up.    b/l knee replacement on 2004 and 2007.    In the ED patient was found to have WBC 20.35, D-dimer 2456, Lactate 1.4, he was givne Azithromycin 500mg x1 and Rocephine 1000mg x1    Subjective - Pt was seen by his bedside, not in acute distress. Pt is feeling much better this am.   REVIEW OF SYSTEMS:    CONSTITUTIONAL: no weakness, nofevers and chills  EYES/ENT: No visual changes;  No vertigo or throat pain   NECK: No pain or stiffness  RESPIRATORY: +productive cough, No wheezing, No hemoptysis; +shortness of breath  CARDIOVASCULAR: +chest discomfort, No palpitations  GASTROINTESTINAL: No abdominal or epigastric pain. No nausea, vomiting, or hematemesis; No diarrhea or constipation. No melena or hematochezia.  GENITOURINARY: No dysuria, frequency or hematuria  NEUROLOGICAL: No numbness or weakness  SKIN: No itching, rashes    Examination-    GENERAL: NAD, lying in bed comfortably  HEAD:  Atraumatic, Normocephalic  EYES: EOMI, PERRLA, conjunctiva and sclera clear  ENT: Moist mucous membranes  NECK: Supple, No JVD  CHEST/LUNG: Clear to auscultation bilaterally; No rales, rhonchi, wheezing, or rubs. Unlabored respirations  HEART: Regular rate and rhythm; No murmurs, rubs, or gallops  ABDOMEN: Bowel sounds present; Soft, Nontender, Nondistended. No hepatomegally  EXTREMITIES:  2+ Peripheral Pulses, brisk capillary refill. No clubbing, cyanosis, or edema  NERVOUS SYSTEM:  Alert & Oriented X3, speech clear. No deficits   MSK: FROM all 4 extremities, full and equal strength  SKIN: No rashes or lesions    Vital Signs Last 24 Hrs  T(C): 36.8 (03 May 2022 07:44), Max: 36.9 (02 May 2022 23:24)  T(F): 98.2 (03 May 2022 07:44), Max: 98.4 (02 May 2022 23:24)  HR: 75 (03 May 2022 07:44) (75 - 80)  BP: 123/71 (03 May 2022 07:44) (122/63 - 125/72)  BP(mean): --  RR: 18 (03 May 2022 07:44) (18 - 20)  SpO2: 95% (03 May 2022 07:44) (94% - 97%)   Labs  -    Pending morning labs                MEDICATIONS  (STANDING):  azithromycin   Tablet 500 milliGRAM(s) Oral daily  cefTRIAXone   IVPB 1000 milliGRAM(s) IV Intermittent every 24 hours  enoxaparin Injectable 40 milliGRAM(s) SubCutaneous every 12 hours    MEDICATIONS  (PRN):        < from: CT Angio Chest PE Protocol w/ IV Cont (04.30.22 @ 18:43) >  IMPRESSION: No pulmonary arterial emboli.    1.7 cm right lower lobe pulmonary nodule associated with right hilar and   mediastinal lymphadenopathy worrisome for primary lung neoplasm with   metastatic disease.    Multiple right supraclavicular and retroclavicular lymph nodes may also   represent metastatic disease as described above. These lymph nodes are   amenable to percutaneous ultrasound-guided biopsy.    Nonspecific 5 mm right upper lobe pulmonary nodule.    A few mildly enlarged portacaval lymph nodes are nonspecific.    PET scan can be performed for complete evaluation.    --- End of Report ---    < end of copied text >

## 2022-05-03 NOTE — CONSULT NOTE ADULT - SUBJECTIVE AND OBJECTIVE BOX
History of Present Illness:  62y Male PMHx of prostate cancer treated w RT 7 years ago, b/l knee replacement admitted w c/o SOB dx w PNA on 22. CT findings suspicious for underlying possible malignancy. Called to consult.   Pt seen.    PMH/PSH:  Prostate CA      History of total bilateral knee replacement (TKR)        Relevant Family History  FAMILY HISTORY:  Family history of thyroid cancer (Mother)    Family history of lung cancer (Father)  had mesothelioma,   in his 90s        SOCIAL HISTORY:  Smoker: [ ] Yes  x[ ] No        PACK YEARS:                         WHEN QUIT?  ETOH use: [ ] Yes  [x ] No              FREQUENCY / QUANTITY:  Ilicit Drug use:  [ ] Yes  [x ] No      MEDICATIONS  (STANDING):  azithromycin   Tablet 500 milliGRAM(s) Oral daily  cefTRIAXone   IVPB 1000 milliGRAM(s) IV Intermittent every 24 hours  enoxaparin Injectable 40 milliGRAM(s) SubCutaneous every 12 hours    MEDICATIONS  (PRN):      Allergies: tetracyclines (Unknown)                                                            LABS:                        14.2   12.29 )-----------( 151      ( 03 May 2022 08:22 )             43.3     05-03    136  |  104  |  11  ----------------------------<  114<H>  4.1   |  29  |  0.85    Ca    8.4<L>      03 May 2022 08:22    TPro  7.7  /  Alb  2.4<L>  /  TBili  0.5  /  DBili  x   /  AST  43<H>  /  ALT  66  /  AlkPhos  66  05-03      Urinalysis Basic - ( 03 May 2022 02:07 )    Color: Laurita / Appearance: Slightly Turbid / S.025 / pH: x  Gluc: x / Ketone: Negative  / Bili: Negative / Urobili: Negative   Blood: x / Protein: 30 mg/dL / Nitrite: Negative   Leuk Esterase: Trace / RBC: 3-5 /HPF / WBC 3-5   Sq Epi: x / Non Sq Epi: Negative / Bacteria: Few      < from: CT Angio Chest PE Protocol w/ IV Cont (22 @ 18:43) >  No pulmonary arterial emboli.    No enlarged axillary lymph nodes. A few right supraclavicular or   retroclavicular lymph nodes with the largest mildly enlarged lymph node   measuring up to about 1.5 cm on image 10 of series 2.    Multiple mediastinal small and enlarged lymph nodes with the largest in   the subcarinal location measuring about 1.8 cm x 3.3 cm as measured on   the axial images. Ill-defined right hilar hypodense opacity surrounding   the right lower lung bronchovascular bundles, part of which measures   about 4.1 x 5 cm also likely represent lymphadenopathy. Smaller left   hilar lymph node is noted. Small and mildly enlarged lymph node adjacent   to the lower portion of the esophagus.    Evaluation of the upper abdomen demonstrate cholelithiasis. A few mildly   enlarged lymph node in the portacaval location largest measuring about   1.3 x 2.1 cm.    Trace bilateral pleural effusions, left greater than right.    Evaluation of the lungs demonstrate emphysema. Mild bilateral lower lung   areas of linear or subsegmental atelectasis, left greater than right.    Dominant 1.7 cm right lower lobe pulmonary nodule on image 86 of series 2.  5 mm right upper lobe pulmonary nodule on image 30 of series 2.    Trace secretions within the trachea extending into the right mainstem   bronchus.    Degenerative changes of the spine. Mild superior endplate loss of height   of the L1 vertebral body.    IMPRESSION: No pulmonary arterial emboli.    1.7 cm right lower lobe pulmonary nodule associated with right hilar and   mediastinal lymphadenopathy worrisome for primary lung neoplasm with   metastatic disease.    Multiple right supraclavicular and retroclavicular lymph nodes may also   represent metastatic disease as described above. These lymph nodes are   amenable to percutaneous ultrasound-guided biopsy.    Nonspecific 5 mm right upper lobe pulmonary nodule.    < end of copied text >  < from: CT Angio Chest PE Protocol w/ IV Cont (22 @ 18:43) >  A few mildly enlarged portacaval lymph nodes are nonspecific.    PET scan can be performed for complete evaluation.    < end of copied text >            Review of Systems             Constitutional: low grade fevers, lack of appetite  HEENT:  dry mouth, sore throat, runny nose, denies photophobia, blurry vision, double vision, glasses, discharge, eye pain, difficulty hearing, vertigo, dysphagia, epistaxis, recent dental work    Respiratory: SOB, WILEY, cough, phlegm,  Cardiovascular: denies CP, palpitations, edema, diaphoresis   Gastrointestinal: denies nausea, vomiting, diarrhea, constipation, abdominal pain, melena   Genitourinary: denies dysuria, frequency, urgency, incontinence, hematuria   Skin/Breast: denies rash, hives, itching, masses, hair loss   Musculoskeletal: denies myalgias, arthritis, joint swelling, muscle weakness  Neurologic: denies syncope, LOC, headache, weakness, dizziness, parasthesias, numbness, seizures, confusion, dementia   Psychiatric: denies feeling anxious, depressed, suicidal, homicidal  Endocrine: denies increased fingerstick glucoses, cold or heat intolerance, polydipsia, polyuria, polyphagia   Hematology/Oncology: h/o prostate CA  ROS negative x 10 systems except as noted above    T(C): 36.4 (22 @ 13:53), Max: 36.9 (22 @ 23:24)  HR: 89 (22 @ 13:53) (75 - 89)  BP: 130/75 (22 @ 13:53) (123/71 - 130/75)    RR: 18 (22 @ 13:53) (18 - 18)  SpO2: 93% (22 @ 13:53) (93% - 97%)      Physical Exam  General: WD, WN, NAD                                                         Neuro: A+O x 3, non-focal, speech clear and intact                     Eyes: PERRL, EOMI   ENT: Normal exam of nasal/oral mucosa with absence of cyanosis.   Neck: supple, no JVD, trachea midline   Chest: CTA, equal bilaterally, no wheezes/rales/rhonchi, normal excursion, no accessory muscle use note  CV: RRR, +S1S2  GI: soft, NT, ND, +BS, no organomegaly noted  Extremities: HAZEL x 4, no C/C/E, distal motor/neuro/circ intact  SKIN: warm, dry, intact    History of Present Illness:  62y Male PMHx of prostate cancer treated w surgery 7 years ago, b/l knee replacement admitted w c/o SOB dx w PNA on 22. CT findings suspicious for underlying possible malignancy. Called to consult.   Pt seen, on O2. Denies hemoptysis, chest pain.    PMH/PSH:  Prostate CA      History of total bilateral knee replacement (TKR)        Relevant Family History  FAMILY HISTORY:  Family history of thyroid cancer (Mother)    Family history of lung cancer (Father)  had mesothelioma,   in his 90s        SOCIAL HISTORY:  Smoker: [ ] Yes  x[ ] No   quit 25 years ago,  was a smoking 1-1.5PPD  ETOH use: [x ] Yes  ] No         on weekends  Ilicit Drug use:  [ ] Yes  [x ] No  lost job last week,      MEDICATIONS  (STANDING):  azithromycin   Tablet 500 milliGRAM(s) Oral daily  cefTRIAXone   IVPB 1000 milliGRAM(s) IV Intermittent every 24 hours  enoxaparin Injectable 40 milliGRAM(s) SubCutaneous every 12 hours    MEDICATIONS  (PRN):      Allergies: tetracyclines (Unknown)                                                            LABS:                        14.2   12.29 )-----------( 151      ( 03 May 2022 08:22 )             43.3     05-03    136  |  104  |  11  ----------------------------<  114<H>  4.1   |  29  |  0.85    Ca    8.4<L>      03 May 2022 08:22    TPro  7.7  /  Alb  2.4<L>  /  TBili  0.5  /  DBili  x   /  AST  43<H>  /  ALT  66  /  AlkPhos  66  05-03      Urinalysis Basic - ( 03 May 2022 02:07 )    Color: Laurita / Appearance: Slightly Turbid / S.025 / pH: x  Gluc: x / Ketone: Negative  / Bili: Negative / Urobili: Negative   Blood: x / Protein: 30 mg/dL / Nitrite: Negative   Leuk Esterase: Trace / RBC: 3-5 /HPF / WBC 3-5   Sq Epi: x / Non Sq Epi: Negative / Bacteria: Few      < from: CT Angio Chest PE Protocol w/ IV Cont (22 @ 18:43) >  No pulmonary arterial emboli.    No enlarged axillary lymph nodes. A few right supraclavicular or   retroclavicular lymph nodes with the largest mildly enlarged lymph node   measuring up to about 1.5 cm on image 10 of series 2.    Multiple mediastinal small and enlarged lymph nodes with the largest in   the subcarinal location measuring about 1.8 cm x 3.3 cm as measured on   the axial images. Ill-defined right hilar hypodense opacity surrounding   the right lower lung bronchovascular bundles, part of which measures   about 4.1 x 5 cm also likely represent lymphadenopathy. Smaller left   hilar lymph node is noted. Small and mildly enlarged lymph node adjacent   to the lower portion of the esophagus.    Evaluation of the upper abdomen demonstrate cholelithiasis. A few mildly   enlarged lymph node in the portacaval location largest measuring about   1.3 x 2.1 cm.    Trace bilateral pleural effusions, left greater than right.    Evaluation of the lungs demonstrate emphysema. Mild bilateral lower lung   areas of linear or subsegmental atelectasis, left greater than right.    Dominant 1.7 cm right lower lobe pulmonary nodule on image 86 of series 2.  5 mm right upper lobe pulmonary nodule on image 30 of series 2.    Trace secretions within the trachea extending into the right mainstem   bronchus.    Degenerative changes of the spine. Mild superior endplate loss of height   of the L1 vertebral body.    IMPRESSION: No pulmonary arterial emboli.    1.7 cm right lower lobe pulmonary nodule associated with right hilar and   mediastinal lymphadenopathy worrisome for primary lung neoplasm with   metastatic disease.    Multiple right supraclavicular and retroclavicular lymph nodes may also   represent metastatic disease as described above. These lymph nodes are   amenable to percutaneous ultrasound-guided biopsy.    Nonspecific 5 mm right upper lobe pulmonary nodule.    < end of copied text >  < from: CT Angio Chest PE Protocol w/ IV Cont (22 @ 18:43) >  A few mildly enlarged portacaval lymph nodes are nonspecific.    PET scan can be performed for complete evaluation.    < end of copied text >            Review of Systems             Constitutional: low grade fevers, lack of appetite  HEENT:  dry mouth, sore throat,     Respiratory: SOB, WILEY, cough, phlegm,  Cardiovascular: denies CP, palpitations, edema, diaphoresis   Gastrointestinal: denies nausea, vomiting, diarrhea, constipation, abdominal pain, melena   Genitourinary: denies dysuria, frequency, urgency, incontinence, hematuria   Skin/Breast: denies rash, hives, itching, masses, hair loss   Musculoskeletal: denies myalgias, arthritis, joint swelling, muscle weakness  Neurologic: denies syncope, LOC, headache, weakness, dizziness, parasthesias, numbness, seizures, confusion, dementia   Psychiatric: denies feeling anxious, depressed, suicidal, homicidal  Endocrine: denies increased fingerstick glucoses, cold or heat intolerance, polydipsia, polyuria, polyphagia   Hematology/Oncology: h/o prostate CA  ROS negative x 10 systems except as noted above    T(C): 36.4 (22 @ 13:53), Max: 36.9 (22 @ 23:24)  HR: 89 (22 @ 13:53) (75 - 89)  BP: 130/75 (22 @ 13:53) (123/71 - 130/75)    RR: 18 (22 @ 13:53) (18 - 18)  SpO2: 93% (22 @ 13:53) (93% - 97%)      Physical Exam  General:  NAD                                                         Neuro: A+O x 3, non-focal, speech clear and intact                     Eyes: PERRL, EOMI   ENT: Normal exam of nasal/oral mucosa with absence of cyanosis.   Neck: supple, no JVD, trachea midline   Chest: CTA, equal bilaterally, no wheezes/rales/rhonchi, normal excursion, no accessory muscle use note  CV: RRR, +S1S2  GI: soft, NT, ND, obese  Extremities: HAZEL x 4, no C/C/E, distal motor/neuro/circ intact  SKIN: warm, dry, intact

## 2022-05-04 ENCOUNTER — TRANSCRIPTION ENCOUNTER (OUTPATIENT)
Age: 62
End: 2022-05-04

## 2022-05-04 PROCEDURE — 99233 SBSQ HOSP IP/OBS HIGH 50: CPT

## 2022-05-04 PROCEDURE — 99233 SBSQ HOSP IP/OBS HIGH 50: CPT | Mod: GC

## 2022-05-04 RX ADMIN — CEFTRIAXONE 100 MILLIGRAM(S): 500 INJECTION, POWDER, FOR SOLUTION INTRAMUSCULAR; INTRAVENOUS at 21:02

## 2022-05-04 RX ADMIN — AZITHROMYCIN 500 MILLIGRAM(S): 500 TABLET, FILM COATED ORAL at 09:42

## 2022-05-04 RX ADMIN — ENOXAPARIN SODIUM 40 MILLIGRAM(S): 100 INJECTION SUBCUTANEOUS at 21:02

## 2022-05-04 RX ADMIN — ENOXAPARIN SODIUM 40 MILLIGRAM(S): 100 INJECTION SUBCUTANEOUS at 09:42

## 2022-05-04 NOTE — PROGRESS NOTE ADULT - SUBJECTIVE AND OBJECTIVE BOX
Date of service: 05-04-22 @ 12:54      Patient lying in bed; no on supplemental oxygen; had lymph node biopsy yesterday, overall feeling improved      ROS: no fever or chills; denies dizziness, no HA, no SOB or cough, no abdominal pain, no diarrhea or constipation; no dysuria, no urinary frequency, no legs pain, no rashes    MEDICATIONS  (STANDING):  azithromycin   Tablet 500 milliGRAM(s) Oral daily  cefTRIAXone   IVPB 1000 milliGRAM(s) IV Intermittent every 24 hours  enoxaparin Injectable 40 milliGRAM(s) SubCutaneous every 12 hours  LORazepam     Tablet 1 milliGRAM(s) Oral once    MEDICATIONS  (PRN):      Vital Signs Last 24 Hrs  T(C): 36.8 (04 May 2022 07:39), Max: 36.9 (03 May 2022 23:10)  T(F): 98.3 (04 May 2022 07:39), Max: 98.4 (03 May 2022 23:10)  HR: 81 (04 May 2022 07:39) (75 - 96)  BP: 133/90 (04 May 2022 07:39) (113/72 - 133/90)  BP(mean): --  RR: 18 (04 May 2022 07:39) (18 - 18)  SpO2: 94% (04 May 2022 07:39) (93% - 95%)        Physical Exam:          Constitutional: frail looking  HEENT: NC/AT, EOMI, PERRLA, conjunctivae clear; ears and nose atraumatic; pharynx clear  Neck: supple; thyroid not palpable  Back: no tenderness  Respiratory: respiratory effort normal; clear to auscultation  Cardiovascular: S1S2 regular, no murmurs  Abdomen: soft, not tender, not distended, positive BS; no liver or spleen organomegaly  Genitourinary: no suprapubic tenderness  Musculoskeletal: no muscle tenderness, no joint swelling or tenderness  Neurological/ Psychiatric: AxOx3, judgement and insight normal;  moving all extremities  Skin: no rashes; no palpable lesions    Labs: all available labs reviewed             Labs:                          Labs:                        14.2   12.29 )-----------( 151      ( 03 May 2022 08:22 )             43.3     05-03    136  |  104  |  11  ----------------------------<  114<H>  4.1   |  29  |  0.85    Ca    8.4<L>      03 May 2022 08:22    TPro  7.7  /  Alb  2.4<L>  /  TBili  0.5  /  DBili  x   /  AST  43<H>  /  ALT  66  /  AlkPhos  66  05-03           Cultures:       Culture - Blood (collected 04-30-22 @ 14:42)  Source: .Blood None  Preliminary Report (05-01-22 @ 19:01):    No growth to date.    Culture - Blood (collected 04-30-22 @ 14:27)  Source: .Blood Blood-Peripheral  Preliminary Report (05-01-22 @ 19:01):    No growth to date.      D-Dimer Assay, Quantitative: 2456 ng/mL DDU (04-30-22 @ 14:42)          < from: CT Angio Chest PE Protocol w/ IV Cont (04.30.22 @ 18:43) >    ACC: [id number] EXAM:  CT ANGIO CHEST PULM [facility]                          PROCEDURE DATE:  04/30/2022          INTERPRETATION:  Clinical indication: Shortness of breath, right lower   lobe infiltrate.    CT angiogram of the chest was performed following intravenous   administration of 90 cc of Omnipaque-350. 10 cc of contrast was   discarded. MIP images are submitted.    No prior chest CTs are available for comparison.    No pulmonary arterial emboli.    No enlarged axillary lymph nodes. A few right supraclavicular or   retroclavicular lymph nodes with the largest mildly enlarged lymph node   measuring up to about 1.5 cm on image 10 of series 2.    Multiple mediastinal small and enlarged lymph nodes with the largest in   the subcarinal location measuring about 1.8 cm x 3.3 cm as measured on   the axial images. Ill-defined right hilar hypodense opacity surrounding   the right lower lung bronchovascular bundles, part of which measures   about 4.1 x 5 cm also likely represent lymphadenopathy. Smaller left   hilar lymph node is noted. Small and mildly enlarged lymph node adjacent   to the lower portion of the esophagus.    Evaluation of the upper abdomen demonstrate cholelithiasis. A few mildly   enlarged lymph node in the portacaval location largest measuring about   1.3 x 2.1 cm.    Trace bilateral pleural effusions, left greater than right.    Evaluation of the lungs demonstrate emphysema. Mild bilateral lower lung   areas of linear or subsegmental atelectasis, left greater than right.    Dominant 1.7 cm right lower lobe pulmonary nodule on image 86 of series 2.  5 mm right upper lobe pulmonary nodule on image 30 of series 2.    Trace secretions within the trachea extending into the right mainstem   bronchus.    Degenerative changes of the spine. Mild superior endplate loss of height   of the L1 vertebral body.    IMPRESSION: No pulmonary arterial emboli.    1.7 cm right lower lobe pulmonary nodule associated with right hilar and   mediastinal lymphadenopathy worrisome for primary lung neoplasm with   metastatic disease.    Multiple right supraclavicular and retroclavicular lymph nodes may also   represent metastatic disease as described above. These lymph nodes are   amenable to percutaneous ultrasound-guided biopsy.    Nonspecific 5 mm right upper lobe pulmonary nodule.    A few mildly enlarged portacaval lymph nodes are nonspecific.    PET scan can be performed for complete evaluation.    < end of copied text >              < end of copied text >            Radiology: all available radiological tests reviewed    Advanced directives addressed: full resuscitation

## 2022-05-04 NOTE — DISCHARGE NOTE PROVIDER - PROVIDER TOKENS
FREE:[LAST:[Babar],FIRST:[Koki],PHONE:[phone]],FAX:[(   )    -],ADDRESS:[[address] [place] [address]] PROVIDER:[TOKEN:[[id number]]],FREE:[LAST:[Eckert],FIRST:[Koki],PHONE:[phone]],FAX:[(   )    -],ADDRESS:[[address] [place] [address]],PROVIDER:[TOKEN:[[id number]]]

## 2022-05-04 NOTE — PROGRESS NOTE ADULT - SUBJECTIVE AND OBJECTIVE BOX
cc - dyspnea, cough   61yo male with PMHx of prostate cancer, b/l knee replacement here complaining of SOB and chest pain that started on the weekend.   Patient mentions his chest pain is non radiating but it is associated with WILEY with minimal movements and SOB all the time. If he tries to take deep breaths he gets chest pain and worsen SOB.  Patient mentions when he started he also had 100.5 fever and has been having productive cough at the beginning was green but for the past couple of days has been clear.  He has never experienced something similar.    Patient was recently seen in the ED on 4/26 after losing his balance and falling backwards.  He did hit his head at that time, but did not lose consciousness.  Patient denies any dizziness, lightheadedness, headache, nausea, vomiting.   On further questioning,  pt admitted to loss of appetite over the last eight days.  He mentioned WILEY ongoing for more than six months.   He was treated for Prostate cancer 7 years ago, has not had any recent follow up.    b/l knee replacement on 2004 and 2007.    In the ED patient was found to have WBC 20.35, D-dimer 2456, Lactate 1.4, he was givne Azithromycin 500mg x1 and Rocephine 1000mg x1    Subjective - Pt was seen by his bedside, not in acute distress. Pt is feeling  better, but still have SOB    REVIEW OF SYSTEMS:    CONSTITUTIONAL: no weakness, nofevers and chills  EYES/ENT: No visual changes;  No vertigo or throat pain   NECK: No pain or stiffness  RESPIRATORY: no cough, No wheezing, No hemoptysis; +shortness of breath  CARDIOVASCULAR: +chest discomfort, No palpitations  GASTROINTESTINAL: No abdominal or epigastric pain. No nausea, vomiting, or hematemesis; No diarrhea or constipation. No melena or hematochezia.  GENITOURINARY: No dysuria, frequency or hematuria  NEUROLOGICAL: No numbness or weakness  SKIN: No itching, rashes    Examination-    GENERAL: NAD, lying in bed comfortably  HEAD:  Atraumatic, Normocephalic  EYES: EOMI, PERRLA, conjunctiva and sclera clear  ENT: Moist mucous membranes  NECK: Supple, No JVD  CHEST/LUNG: Clear to auscultation bilaterally; No rales, rhonchi, wheezing, or rubs. Unlabored respirations  HEART: Regular rate and rhythm; No murmurs, rubs, or gallops  ABDOMEN: Bowel sounds present; Soft, Nontender, Nondistended. No hepatomegally  EXTREMITIES:  2+ Peripheral Pulses, brisk capillary refill. No clubbing, cyanosis, or edema  NERVOUS SYSTEM:  Alert & Oriented X3, speech clear. No deficits   MSK: FROM all 4 extremities, full and equal strength  SKIN: No rashes or lesions    Vital Signs Last 24 Hrs  T(C): 36.8 (04 May 2022 07:39), Max: 36.9 (03 May 2022 23:10)  T(F): 98.3 (04 May 2022 07:39), Max: 98.4 (03 May 2022 23:10)  HR: 81 (04 May 2022 07:39) (75 - 96)  BP: 133/90 (04 May 2022 07:39) (113/72 - 133/90)  BP(mean): --  RR: 18 (04 May 2022 07:39) (18 - 18)  SpO2: 94% (04 May 2022 07:39) (93% - 95%)     Labs  -                          14.2   12.29 )-----------( 151      ( 03 May 2022 08:22 )             43.3   05-03    136  |  104  |  11  ----------------------------<  114<H>  4.1   |  29  |  0.85    Ca    8.4<L>      03 May 2022 08:22    TPro  7.7  /  Alb  2.4<L>  /  TBili  0.5  /  DBili  x   /  AST  43<H>  /  ALT  66  /  AlkPhos  66  05-03                 MEDICATIONS  (STANDING):  azithromycin   Tablet 500 milliGRAM(s) Oral daily  cefTRIAXone   IVPB 1000 milliGRAM(s) IV Intermittent every 24 hours  enoxaparin Injectable 40 milliGRAM(s) SubCutaneous every 12 hours    MEDICATIONS  (PRN):        < from: CT Angio Chest PE Protocol w/ IV Cont (04.30.22 @ 18:43) >  IMPRESSION: No pulmonary arterial emboli.    1.7 cm right lower lobe pulmonary nodule associated with right hilar and   mediastinal lymphadenopathy worrisome for primary lung neoplasm with   metastatic disease.    Multiple right supraclavicular and retroclavicular lymph nodes may also   represent metastatic disease as described above. These lymph nodes are   amenable to percutaneous ultrasound-guided biopsy.    Nonspecific 5 mm right upper lobe pulmonary nodule.    A few mildly enlarged portacaval lymph nodes are nonspecific.    PET scan can be performed for complete evaluation.    --- End of Report ---    < end of copied text >

## 2022-05-04 NOTE — PROGRESS NOTE ADULT - ATTENDING COMMENTS
61yo male with PMHx of prostate cancer, b/l knee replacement here complaining of SOB and chest discomfort that started on the weekend.    #AHRF and Sepsis 2/2 CAP  -Pt met SIRS criteria on admission  -CT angio-No pulmonary arterial emboli.  - Continue O2 NC currently on 4 L, titrate PRN.  goal SpO2 >92%  - f/u PSA Hx of prostate Ca 7 yrs ago, no recent follow up  - Pro-BNP unremarkable. Low suspicion for RV strain. Holding off on TTE to check RV strain  - Continue Azithromycin 500mg qd  - Continue Rocephin 1000mg qd  - Monitor vitals closely
63yo male with PMHx of prostate cancer, b/l knee replacement here complaining of SOB and chest discomfort that started on the weekend.    #AHRF and Sepsis 2/2 CAP  -Improving   -Pt met SIRS criteria on admission  -CT angio-No pulmonary arterial emboli.  - Continue O2 NC currently on 4 L, titrate PRN.  goal SpO2 >92%  - PSA WNL Hx of prostate CA   - Pro-BNP unremarkable. Low suspicion for RV strain. Holding off on TTE to check RV strain  - Continue Azithromycin 500mg qd  - Continue Rocephin 1000mg qd  - Monitor vitals closely
Patient seen and examined  on rounds with Ken Vieyra MD day caryn resident  .  I was physically present for the key portion of the evaluation and management service provided . I  examined the patient myself and reviewed the plan of care with the patient and  , which I have reviewed and edited .  Medical records reviewed. History, review of systems, physical findings and lab results as documented confirmed , except as modified by me.  Agree with the assessment and plan of  as stated and discussed, except as modified below.     61yo male with PMHx of prostate cancer, b/l knee replacement here complaining of SOB and chest discomfort that started on the weekend.    VSS   PHYSICAL EXAM:  Constitutional: NAD, awake and alert   HEENT: PERR, EOMI, Normal Hearing, MMM  Neck: Soft and supple, No LAD, No JVD  Respiratory: Breath sounds decreased at bases,  No wheezing, rales , + rhonchi  Cardiovascular: S1 and S2, regular rate and rhythm, no Murmurs, gallops or rubs  Gastrointestinal: Bowel Sounds present, soft, nontender , nondistended, no guarding, no rebound  Extremities: No peripheral edema  Vascular: 2+ peripheral pulses  Neurological: A/O x 3, no focal deficits  Musculoskeletal: 5/5 strength b/l upper and lower extremities     A/P   1. Acute hypoxic respiratory failure due to PNA, PE ruled out  - c/w IV abx, pulmonary consult  2. 1.7 cm RLL pulmonary nodule with LAD suspicious for malignancy, 5 mm RUL nodule - oncology evaluation, plan for CT abd/pelv, MRI of the brain as per oncology , will do hold contrast studies until ada due to IV contrast exposure on 4/30/22 to prevent CHUY  3. Chest pain - EKG - neg, troponin x1 neg, 2 d echo pending  4. mild thrombocytopenia - monitor while on lovenox prophylactic dose  5. Transaminitis - monitor , CTA - cholelithiasis, enlarged portacaval LN , CT abd - planed as per oncology   6. Hypocalcemia - check vit D level    Dispo - IV abx, oncology and pulmonology work-up
61yo male with PMHx of prostate cancer, b/l knee replacement here complaining of SOB and chest discomfort that started on the weekend.    #AHRF and Sepsis 2/2 CAP  -Pt met SIRS criteria on admission  -CT angio-No pulmonary arterial emboli.  - Continue O2 NC currently on 4 L, titrate PRN.  goal SpO2 >92%  - PSA WNL Hx of prostate CA   - Pro-BNP unremarkable. Low suspicion for RV strain. Holding off on TTE to check RV strain  - Continue Azithromycin 500mg qd  - Continue Rocephin 1000mg qd  - Monitor vitals closely

## 2022-05-04 NOTE — DISCHARGE NOTE PROVIDER - HOSPITAL COURSE
63yo male with PMHx of prostate cancer, b/l knee replacement presented to HH/ED 2/2 SOB, fever and chest pain. In the ED patient was found to have WBC 20.35, D-dimer 2456, Lactate 1.4, he was given Azithromycin 500mg x1 and Rocephine 1000mg x1.Ct chest ruled out PE. However, scan was positive for possible pneumonia,  1.7 cm right lower lobe pulmonary nodule associated with right hilar and mediastinal lymphadenopathy, Multiple right supraclavicular and retroclavicular lymph nodes, worrisome for primary lung neoplasm with metastatic disease. P[t was admitted to Med/Surg for further management. On the warren pt was treated with Iv Ceftriaxone and azithromycin for possible CAP. Pt had ct chest and Abdomen With IV contrast for Malignancy workup.            63yo male with PMHx of prostate cancer, b/l knee replacement presented to HH/ED 2/2 SOB, fever and chest pain. In the ED patient was found to have WBC 20.35, D-dimer 2456, Lactate 1.4, he was given Azithromycin 500mg x1 and Rocephine 1000mg x1.Ct chest ruled out PE. However, scan was positive for possible pneumonia,  1.7 cm right lower lobe pulmonary nodule associated with right hilar and mediastinal lymphadenopathy, Multiple right supraclavicular and retroclavicular lymph nodes, worrisome for primary lung neoplasm with metastatic disease. P[t was admitted to Med/Surg for further management. On the warren pt was treated with Iv Ceftriaxone and azithromycin for possible CAP. Pt had ct chest and Abdomen With IV contrast for Malignancy workup. Pt was seen by Pulmonology, Hem/Onc and Infectious diseases. Pt had Supraclavicular lymph node biopsy and it was negative for Malignancy. After discharge pt have to follow-up with the pulmonology DR. Barnes and Hem/Onc   for further investigation. Pt has to take Ceftin 500mg po bid for 5 more days.      Vital Signs Last 24 Hrs  T(C): 36.7 (05 May 2022 07:44), Max: 36.9 (04 May 2022 15:48)  T(F): 98 (05 May 2022 07:44), Max: 98.5 (04 May 2022 15:48)  HR: 79 (05 May 2022 07:44) (79 - 83)  BP: 132/74 (05 May 2022 07:44) (125/79 - 142/75)  BP(mean): --  RR: 18 (05 May 2022 07:44) (18 - 18)  SpO2: 92% (05 May 2022 07:44) (92% - 95%)   REVIEW OF SYSTEMS:    CONSTITUTIONAL: No weakness, fevers or chills  EYES/ENT: No visual changes;  No vertigo or throat pain   NECK: No pain or stiffness  RESPIRATORY: No cough, wheezing, hemoptysis; No shortness of breath  CARDIOVASCULAR: No chest pain or palpitations  GASTROINTESTINAL: No abdominal or epigastric pain. No nausea, vomiting, or hematemesis; No diarrhea or constipation. No melena or hematochezia.  GENITOURINARY: No dysuria, frequency or hematuria  NEUROLOGICAL: No numbness or weakness  SKIN: No itching, rashes    VITALS:     GENERAL: NAD, lying in bed comfortably  HEAD:  Atraumatic, Normocephalic  EYES: EOMI, PERRLA, conjunctiva and sclera clear  ENT: Moist mucous membranes  NECK: Supple, No JVD  CHEST/LUNG: Clear to auscultation bilaterally; No rales, rhonchi, wheezing, or rubs. Unlabored respirations  HEART: Regular rate and rhythm; No murmurs, rubs, or gallops  ABDOMEN: Bowel sounds present; Soft, Nontender, Nondistended. No hepatomegaly  EXTREMITIES:  2+ Peripheral Pulses, brisk capillary refill. No clubbing, cyanosis, or edema  NERVOUS SYSTEM:  Alert & Oriented X3, speech clear. No deficits   MSK: FROM all 4 extremities, full and equal strength  SKIN: No rashes or lesions     CT Abdomen and Pelvis w/ IV Cont    IMPRESSION:  Mildly thickened fluid-filled distal small bowel loops suggestive of   enteritis. No bowel obstruction. Multiple lung findings, better visualized on recent CT of the chest. 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 However, correlate with prior outside   imaging. Mild splenomegaly. Additional findings as above.    CT Angio Chest PE Protocol w/ IV Cont (04.30.22 @ 18:43)    IMPRESSION: No pulmonary arterial emboli.    1.7 cm right lower lobe pulmonary nodule associated with right hilar and   mediastinal lymphadenopathy worrisome for primary lung neoplasm with   metastatic disease. Multiple right supraclavicular and retroclavicular lymph nodes may also   represent metastatic disease as described above. These lymph nodes are   amenable to percutaneous ultrasound-guided biopsy. Nonspecific 5 mm right upper lobe pulmonary nodule A few mildly enlarged portacaval lymph nodes are nonspecific.  PET scan can be performed for complete evaluation.                       61yo male with PMHx of prostate cancer, b/l knee replacement presented to HH/ED 2/2 SOB, fever, and chest pain. In the ED patient was found to have WBC 20.35, D-dimer 2456, and Lactate 1.4; he was given Azithromycin 500mg x1 and Rocephine 1000mg x1. Ct chest ruled out PE. However, the scan was positive for possible pneumonia,  1.7 cm right lower lobe pulmonary nodule associated with right hilar and mediastinal lymphadenopathy, Multiple right supraclavicular and retroclavicular lymph nodes, worrisome for primary lung neoplasm with metastatic disease. P[t was admitted to Med/Surg for further management. On the [place]ren, pt was treated with Iv Ceftriaxone and azithromycin for possible CAP. Pt had ct chest and Abdomen With IV contrast for Malignancy workup. Pt was seen by Pulmonology, Hem/Onc, Cardiothoracic surgery and Infectious diseases. Pt had a Supraclavicular lymph node biopsy, and it was negative for Malignancy. After discharge, pt has to follow up with the pulmonology DR. Roland, and Hem/Onc   for further investigation. Follow-up with Cardiothoracic Dr. Eckert  for clavicular lymph node biopsy. Pt has to take Ceftin 500mg PO bid for five more days.     Vital Signs Last 24 Hrs  T(C): 36.7 (05 May 2022 07:44), Max: 36.9 (04 May 2022 15:48)  T(F): 98 (05 May 2022 07:44), Max: 98.5 (04 May 2022 15:48)  HR: 79 (05 May 2022 07:44) (79 - 83)  BP: 132/74 (05 May 2022 07:44) (125/79 - 142/75)  BP(mean): --  RR: 18 (05 May 2022 07:44) (18 - 18)  SpO2: 92% (05 May 2022 07:44) (92% - 95%)   REVIEW OF SYSTEMS:    CONSTITUTIONAL: No weakness, fevers or chills  EYES/ENT: No visual changes;  No vertigo or throat pain   NECK: No pain or stiffness  RESPIRATORY: No cough, wheezing, hemoptysis; No shortness of breath  CARDIOVASCULAR: No chest pain or palpitations  GASTROINTESTINAL: No abdominal or epigastric pain. No nausea, vomiting, or hematemesis; No diarrhea or constipation. No melena or hematochezia.  GENITOURINARY: No dysuria, frequency or hematuria  NEUROLOGICAL: No numbness or weakness  SKIN: No itching, rashes    VITALS:     GENERAL: NAD, lying in bed comfortably  HEAD:  Atraumatic, Normocephalic  EYES: EOMI, PERRLA, conjunctiva and sclera clear  ENT: Moist mucous membranes  NECK: Supple, No JVD  CHEST/LUNG: Clear to auscultation bilaterally; No rales, rhonchi, wheezing, or rubs. Unlabored respirations  HEART: Regular rate and rhythm; No murmurs, rubs, or gallops  ABDOMEN: Bowel sounds present; Soft, Nontender, Nondistended. No hepatomegaly  EXTREMITIES:  2+ Peripheral Pulses, brisk capillary refill. No clubbing, cyanosis, or edema  NERVOUS SYSTEM:  Alert & Oriented X3, speech clear. No deficits   MSK: FROM all 4 extremities, full and equal strength  SKIN: No rashes or lesions     CT Abdomen and Pelvis w/ IV Cont    IMPRESSION:  Mildly thickened fluid-filled distal small bowel loops suggestive of   enteritis. No bowel obstruction. Multiple lung findings, better visualized on recent CT of the chest. 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 However, correlate with prior outside   imaging. Mild splenomegaly. Additional findings as above.    CT Angio Chest PE Protocol w/ IV Cont (04.30.22 @ 18:43)    IMPRESSION: No pulmonary arterial emboli.    1.7 cm right lower lobe pulmonary nodule associated with right hilar and   mediastinal lymphadenopathy worrisome for primary lung neoplasm with   metastatic disease. Multiple right supraclavicular and retroclavicular lymph nodes may also   represent metastatic disease as described above. These lymph nodes are   amenable to percutaneous ultrasound-guided biopsy. Nonspecific 5 mm right upper lobe pulmonary nodule A few mildly enlarged portacaval lymph nodes are nonspecific.  PET scan can be performed for complete evaluation.                       63yo male with PMHx of prostate cancer, b/l knee replacement presented to HH/ED 2/2 SOB, fever, and chest pain. In the ED patient was found to have WBC 20.35, D-dimer 2456, and Lactate 1.4; he was given Azithromycin 500mg x1 and Rocephine 1000mg x1. Ct chest ruled out PE. However, the scan was positive for possible pneumonia,  1.7 cm right lower lobe pulmonary nodule associated with right hilar and mediastinal lymphadenopathy, Multiple right supraclavicular and retroclavicular lymph nodes, worrisome for primary lung neoplasm with metastatic disease. Pt was admitted to Med/Surg for further management. On the [place]ren, pt was treated with Iv Ceftriaxone and azithromycin for possible CAP. Pt had ct chest and Abdomen With IV contrast for Malignancy workup. Pt was seen by Pulmonology, Hem/Onc, Cardiothoracic surgery and Infectious diseases. Pt had a Supraclavicular lymph node biopsy, and it was negative for Malignancy. After discharge, pt has to follow up with the pulmonology DR. Roland, and Hem/Onc   for further investigation. Follow-up with Cardiothoracic Dr. Eckert  for clavicular lymph node biopsy. Pt has to take Ceftin 500mg PO bid for five more days.     Vital Signs Last 24 Hrs  T(C): 36.7 (05 May 2022 07:44), Max: 36.9 (04 May 2022 15:48)  T(F): 98 (05 May 2022 07:44), Max: 98.5 (04 May 2022 15:48)  HR: 79 (05 May 2022 07:44) (79 - 83)  BP: 132/74 (05 May 2022 07:44) (125/79 - 142/75)  BP(mean): --  RR: 18 (05 May 2022 07:44) (18 - 18)  SpO2: 92% (05 May 2022 07:44) (92% - 95%)   REVIEW OF SYSTEMS:    CONSTITUTIONAL: No weakness, fevers or chills  EYES/ENT: No visual changes;  No vertigo or throat pain   NECK: No pain or stiffness  RESPIRATORY: No cough, wheezing, hemoptysis; No shortness of breath  CARDIOVASCULAR: No chest pain or palpitations  GASTROINTESTINAL: No abdominal or epigastric pain. No nausea, vomiting, or hematemesis; No diarrhea or constipation. No melena or hematochezia.  GENITOURINARY: No dysuria, frequency or hematuria  NEUROLOGICAL: No numbness or weakness  SKIN: No itching, rashes    VITALS:     GENERAL: NAD, lying in bed comfortably  HEAD:  Atraumatic, Normocephalic  EYES: EOMI, PERRLA, conjunctiva and sclera clear  ENT: Moist mucous membranes  NECK: Supple, No JVD  CHEST/LUNG: Clear to auscultation bilaterally; No rales, rhonchi, wheezing, or rubs. Unlabored respirations  HEART: Regular rate and rhythm; No murmurs, rubs, or gallops  ABDOMEN: Bowel sounds present; Soft, Nontender, Nondistended. No hepatomegaly  EXTREMITIES:  2+ Peripheral Pulses, brisk capillary refill. No clubbing, cyanosis, or edema  NERVOUS SYSTEM:  Alert & Oriented X3, speech clear. No deficits   MSK: FROM all 4 extremities, full and equal strength  SKIN: No rashes or lesions     CT Abdomen and Pelvis w/ IV Cont    IMPRESSION:  Mildly thickened fluid-filled distal small bowel loops suggestive of   enteritis. No bowel obstruction. Multiple lung findings, better visualized on recent CT of the chest. 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 However, correlate with prior outside   imaging. Mild splenomegaly. Additional findings as above.    CT Angio Chest PE Protocol w/ IV Cont (04.30.22 @ 18:43)    IMPRESSION: No pulmonary arterial emboli.    1.7 cm right lower lobe pulmonary nodule associated with right hilar and   mediastinal lymphadenopathy worrisome for primary lung neoplasm with   metastatic disease. Multiple right supraclavicular and retroclavicular lymph nodes may also   represent metastatic disease as described above. These lymph nodes are   amenable to percutaneous ultrasound-guided biopsy. Nonspecific 5 mm right upper lobe pulmonary nodule A few mildly enlarged portacaval lymph nodes are nonspecific.  PET scan can be performed for complete evaluation.                       63yo male with PMHx of prostate cancer, b/l knee replacement presented to HH/ED 2/2 SOB, fever, and chest pain. In the ED patient was found to have WBC 20.35, D-dimer 2456, and Lactate 1.4; he was given Azithromycin 500mg x1 and Rocephine 1000mg x1. Ct chest ruled out PE. However, the scan was positive for possible pneumonia,  1.7 cm right lower lobe pulmonary nodule associated with right hilar and mediastinal lymphadenopathy, Multiple right supraclavicular and retroclavicular lymph nodes, worrisome for primary lung neoplasm with metastatic disease. Pt was admitted to Med/Surg for further management. On the [place]ren, pt was treated with Iv Ceftriaxone and azithromycin for possible CAP. Pt had ct chest and Abdomen With IV contrast for Malignancy workup. Pt was seen by Pulmonology, Hem/Onc, Cardiothoracic surgery and Infectious diseases. Pt had a Supraclavicular lymph node biopsy, and it was negative for Malignancy. After discharge, pt has to follow up with the pulmonology DR. Roland, and Hem/Onc   for further investigation. Follow-up with Cardiothoracic Dr. Eckert  for clavicular lymph node biopsy. Pt has to take Ceftin 500mg PO bid for five more days.     Subjective- Today, pt was seen by his bedside, awake and oriented to place and time. Vitals and examination were normal. Pt is medically stable for discharge.    Home Oxygen Evaluation:  Pulse ox (SpO2) on room air at rest:  96 %  Pulse ox (SpO2) on room air while ambulatin %    Vital Signs Last 24 Hrs  T(C): 36.7 (05 May 2022 07:44), Max: 36.9 (04 May 2022 15:48)  T(F): 98 (05 May 2022 07:44), Max: 98.5 (04 May 2022 15:48)  HR: 79 (05 May 2022 07:44) (79 - 83)  BP: 132/74 (05 May 2022 07:44) (125/79 - 142/75)  BP(mean): --  RR: 18 (05 May 2022 07:44) (18 - 18)  SpO2: 92% (05 May 2022 07:44) (92% - 95%)   REVIEW OF SYSTEMS:    CONSTITUTIONAL: No weakness, fevers or chills  EYES/ENT: No visual changes;  No vertigo or throat pain   NECK: No pain or stiffness  RESPIRATORY: No cough, wheezing, hemoptysis; No shortness of breath  CARDIOVASCULAR: No chest pain or palpitations  GASTROINTESTINAL: No abdominal or epigastric pain. No nausea, vomiting, or hematemesis; No diarrhea or constipation. No melena or hematochezia.  GENITOURINARY: No dysuria, frequency or hematuria  NEUROLOGICAL: No numbness or weakness  SKIN: No itching, rashes    VITALS:     GENERAL: NAD, lying in bed comfortably  HEAD:  Atraumatic, Normocephalic  EYES: EOMI, PERRLA, conjunctiva and sclera clear  ENT: Moist mucous membranes  NECK: Supple, No JVD  CHEST/LUNG: Clear to auscultation bilaterally; No rales, rhonchi, wheezing, or rubs. Unlabored respirations  HEART: Regular rate and rhythm; No murmurs, rubs, or gallops  ABDOMEN: Bowel sounds present; Soft, Nontender, Nondistended. No hepatomegaly  EXTREMITIES:  2+ Peripheral Pulses, brisk capillary refill. No clubbing, cyanosis, or edema  NERVOUS SYSTEM:  Alert & Oriented X3, speech clear. No deficits   MSK: FROM all 4 extremities, full and equal strength  SKIN: No rashes or lesions     CT Abdomen and Pelvis w/ IV Cont    IMPRESSION:  Mildly thickened fluid-filled distal small bowel loops suggestive of   enteritis. No bowel obstruction. Multiple lung findings, better visualized on recent CT of the chest. 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 However, correlate with prior outside   imaging. Mild splenomegaly. Additional findings as above.    CT Angio Chest PE Protocol w/ IV Cont (22 @ 18:43)    IMPRESSION: No pulmonary arterial emboli.    1.7 cm right lower lobe pulmonary nodule associated with right hilar and   mediastinal lymphadenopathy worrisome for primary lung neoplasm with   metastatic disease. Multiple right supraclavicular and retroclavicular lymph nodes may also   represent metastatic disease as described above. These lymph nodes are   amenable to percutaneous ultrasound-guided biopsy. Nonspecific 5 mm right upper lobe pulmonary nodule A few mildly enlarged portacaval lymph nodes are nonspecific.  PET scan can be performed for complete evaluation.

## 2022-05-04 NOTE — DISCHARGE NOTE NURSING/CASE MANAGEMENT/SOCIAL WORK - PATIENT PORTAL LINK FT
You can access the FollowMyHealth Patient Portal offered by [facility] by registering at the following website: [email or web address]. By joining [facility]’s FollowMyHealth portal, you will also be able to view your health information using other applications (apps) compatible with our system.

## 2022-05-04 NOTE — DISCHARGE NOTE PROVIDER - NSDCMRMEDTOKEN_GEN_ALL_CORE_FT
hydrocodone-acetaminophen 10 mg-325 mg oral tablet: 1-2 tab(s) orally every 4 to 6 hours  sildenafil 100 mg oral tablet: 1 tab(s) orally once a day, As Needed   cefuroxime 500 mg oral tablet: 1 tab(s) orally every 12 hours  hydrocodone-acetaminophen 10 mg-325 mg oral tablet: 1-2 tab(s) orally every 4 to 6 hours  sildenafil 100 mg oral tablet: 1 tab(s) orally once a day, As Needed

## 2022-05-04 NOTE — DISCHARGE NOTE NURSING/CASE MANAGEMENT/SOCIAL WORK - NSDCPEFALRISK_GEN_ALL_CORE
For information on Fall & Injury Prevention, visit: [email or web address] OR  [email or web address] OR  https://www.cdc.gov/steadi/patient.html

## 2022-05-04 NOTE — DISCHARGE NOTE PROVIDER - NSDCCPCAREPLAN_GEN_ALL_CORE_FT
PRINCIPAL DISCHARGE DIAGNOSIS  Diagnosis: CAP (community acquired pneumonia)  Assessment and Plan of Treatment: You have pneumonia and you were treated with Iv antibiotics. After discharge please take 5oo mg orally two times a day for five more days. Please follow-up with your primarey care provider for further management.      SECONDARY DISCHARGE DIAGNOSES  Diagnosis: Mediastinal lymphadenopathy  Assessment and Plan of Treatment: lymphnodes in your chest and arround the colour bone are mildly enlarged. Limphnode biopsy taken from your colour bone is negative for cancer. Please follow-up with Dr. Barnes for further investigatin and management. Please follow-up with Dr. Koki Eckert for Lymphnode biopsy results.     PRINCIPAL DISCHARGE DIAGNOSIS  Diagnosis: CAP (community acquired pneumonia)  Assessment and Plan of Treatment: You have pneumonia, and you were treated with Iv antibiotics. After discharge, please take 5oo mg orally two times a day for five more days. Please follow up with your primary care provider for further management.      SECONDARY DISCHARGE DIAGNOSES  Diagnosis: Mediastinal lymphadenopathy  Assessment and Plan of Treatment: Lymph nodes in your chest and around the collar bone are mildly enlarged. A lymph node biopsy taken from your collar bone is negative for cancer. Please follow up with Dr. Roland and  for further investigation and management. Please follow up with Dr. Koki Eckert for Lymph node biopsy results.     PRINCIPAL DISCHARGE DIAGNOSIS  Diagnosis: CAP (community acquired pneumonia)  Assessment and Plan of Treatment: You have pneumonia, and you were treated with Iv antibiotics. After discharge, please take 5oo mg orally two times a day for five more days. Please follow up with your primary care provider for further management.      SECONDARY DISCHARGE DIAGNOSES  Diagnosis: Mediastinal lymphadenopathy  Assessment and Plan of Treatment: Lymph nodes in your chest and around the collar bone are mildly enlarged. A lymph node biopsy taken from your collar bone,please follow up with Dr. Koki Eckert (cardiothoracic)for Lymph node biopsy results.. Please follow up with Dr. Roland and  for further investigation and management.

## 2022-05-04 NOTE — DISCHARGE NOTE PROVIDER - CARE PROVIDERS DIRECT ADDRESSES
,DirectAddress_Unknown ,[email or web address],DirectAddress_Unknown,DirectAddress_Unknown

## 2022-05-04 NOTE — PROGRESS NOTE ADULT - SUBJECTIVE AND OBJECTIVE BOX
Subjective:  had supraclavicular ln biopsy  prelim normal architecture  this morning no complaints  breathing better  declined mri    Review of Systems:  All 10 systems reviewed in detailed and found to be negative with the exception of what has already been described above    Allergies:  tetracyclines (Unknown)    Meds  MEDICATIONS  (STANDING):  azithromycin   Tablet 500 milliGRAM(s) Oral daily  cefTRIAXone   IVPB 1000 milliGRAM(s) IV Intermittent every 24 hours  enoxaparin Injectable 40 milliGRAM(s) SubCutaneous every 12 hours  LORazepam     Tablet 1 milliGRAM(s) Oral once    MEDICATIONS  (PRN):    Physical Exam  T(C): 36.8 (22 @ 07:39), Max: 36.9 (22 @ 23:10)  HR: 81 (22 @ 07:39) (75 - 96)  BP: 133/90 (22 @ 07:39) (113/72 - 133/90)  RR: 18 (22 @ 07:39) (18 - 18)  SpO2: 94% (22 @ 07:39) (93% - 95%)  Gen: Alert, oriented, no distress  HEENT: Anicteric sclera, moist mucous membranes, no JVD, no lymphadenopathy  Cardio: Regular rhythm and rate, normal S1S2, no murmurs  Resp: Clear to auscultation bilaterally, no wheezing or rhonchi  GI: Nontender, nondistended, normoactive bowel sounds  Ext: No cyanosis, clubbing or edema  Neuro: Nonfocal    Labs:                        14.2   12.29 )-----------( 151      ( 03 May 2022 08:22 )             43.3         136  |  104  |  11  ----------------------------<  114<H>  4.1   |  29  |  0.85    Ca    8.4<L>      03 May 2022 08:22    TPro  7.7  /  Alb  2.4<L>  /  TBili  0.5  /  DBili  x   /  AST  43<H>  /  ALT  66  /  AlkPhos  66  05-03      Urinalysis Basic - ( 03 May 2022 02:07 )    Color: Laurita / Appearance: Slightly Turbid / S.025 / pH: x  Gluc: x / Ketone: Negative  / Bili: Negative / Urobili: Negative   Blood: x / Protein: 30 mg/dL / Nitrite: Negative   Leuk Esterase: Trace / RBC: 3-5 /HPF / WBC 3-5   Sq Epi: x / Non Sq Epi: Negative / Bacteria: Few    CT Scan: personally reviewed  < from: CT Abdomen and Pelvis w/ IV Cont (22 @ 09:16) >  PROCEDURE DATE:  2022          INTERPRETATION:  CLINICAL INFORMATION: Metastatic lung disease, prostate   cancer    COMPARISON: 2022    CONTRAST/COMPLICATIONS:  IV Contrast: Omnipaque 350  90 cc administered   10 cc discarded  Oral Contrast: NONE  Complications: None reported at time of study completion    PROCEDURE:  CT of the Abdomen and Pelvis was performed.  Sagittal and coronal reformats were performed.    FINDINGS:  LOWER CHEST: Masslike opacity surrounding the inferior right hilum and   right lower lobe nodule. Trace left pleural effusion with associated   atelectasis    LIVER: Within normal limits.  BILE DUCTS: Normal caliber.  GALLBLADDER: Cholelithiasis.  SPLEEN: Mildly enlarged.  PANCREAS: Within normal limits.  ADRENALS: Within normal limits.  KIDNEYS/URETERS: Subcentimeter hypodensity in the midpole of the right   kidney with an associated punctate calcification. No hydronephrosis.    BLADDER:Within normal limits.  REPRODUCTIVE ORGANS: Prostatectomy.    BOWEL: No bowel obstruction. Mildly thickened fluid-filled, distal small   bowel loops. Appendix is normal. Colonic diverticulosis. Large amount   stool within the rectum.  PERITONEUM: No ascites.  VESSELS: Atherosclerotic changes.  RETROPERITONEUM/LYMPH NODES: No lymphadenopathy.  ABDOMINAL WALL: Small fat-containing left inguinal hernia. Partially   visualized fatty mass within the right hip adductor musculature.  BONES: Mild compression deformity of L1 of indeterminate age. Old right   inferior rib fractures    IMPRESSION:  Mildly thickened fluid-filled distal small bowel loops suggestive of   enteritis. No bowel obstruction.    Multiple lung findings, better visualized on recent CT of the chest.    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 However, correlate with prior outside   imaging.    Mild splenomegaly.    Additional findings as above.    < from: CT Angio Chest PE Protocol w/ IV Cont (22 @ 18:43) >  PROCEDURE DATE:  2022          INTERPRETATION:  Clinical indication: Shortness of breath, right lower   lobe infiltrate.    CT angiogram of the chest was performed following intravenous   administration of 90 cc of Omnipaque-350. 10 cc of contrast was   discarded. MIP images are submitted.    No prior chest CTs are available for comparison.    No pulmonary arterial emboli.    No enlarged axillary lymph nodes. A few right supraclavicular or   retroclavicular lymph nodes with the largest mildly enlarged lymph node   measuring up to about 1.5 cm on image 10 of series 2.    Multiple mediastinal small and enlarged lymph nodes with the largest in   the subcarinal location measuring about 1.8 cm x 3.3 cm as measured on   the axial images. Ill-defined right hilar hypodense opacity surrounding   the right lower lung bronchovascular bundles, part of which measures   about 4.1 x 5 cm also likely represent lymphadenopathy. Smaller left   hilar lymph node is noted. Small and mildly enlarged lymph node adjacent   to the lower portion of the esophagus.    Evaluation of the upper abdomen demonstrate cholelithiasis. A few mildly   enlarged lymph node in the portacaval location largest measuring about   1.3 x 2.1 cm.    Trace bilateral pleural effusions, left greater than right.    Evaluation of the lungs demonstrate emphysema. Mild bilateral lower lung   areas of linear or subsegmental atelectasis, left greater than right.    Dominant 1.7 cm right lower lobe pulmonary nodule on image 86 of series 2.  5 mm right upper lobe pulmonary nodule on image 30 of series 2.    Trace secretions within the trachea extending into the right mainstem   bronchus.    Degenerative changes of the spine. Mild superior endplate loss of height   of the L1 vertebral body.    IMPRESSION: No pulmonary arterial emboli.    1.7 cm right lower lobe pulmonary nodule associated with right hilar and   mediastinal lymphadenopathy worrisome for primary lung neoplasm with   metastatic disease.    Multiple right supraclavicular and retroclavicular lymph nodes may also   represent metastatic disease as described above. These lymph nodes are   amenable to percutaneous ultrasound-guided biopsy.    Nonspecific 5 mm right upper lobe pulmonary nodule.    A few mildly enlarged portacaval lymph nodes are nonspecific.    PET scan can be performed for complete evaluation.

## 2022-05-04 NOTE — DISCHARGE NOTE PROVIDER - NSDCCAREPROVSEEN_GEN_ALL_CORE_FT
OBS/Other Documents Kj, Anurag Smith, Chase Bacon, Vidya Molina, Hong Bey, Antonina Blackburn, Gloria Fonseca, John Vieyra, Ken Salas, Danita Harrell, Yvette Graham, River Mosley, Napoleon Eckert, Koki Sarkar, Fuad

## 2022-05-04 NOTE — DISCHARGE NOTE PROVIDER - CARE PROVIDER_API CALL
Koki Eckert  [address] [facility]  Phone: [phone]  Fax: (   )    -  Follow Up Time:    Napoleon Mosley)  Critical Care Medicine; Internal Medicine; Pulmonary Disease  [address] [place] [address]  Phone: [phone]  Fax: [phone]  Follow Up Time:     Koki Eckert  [address] [facility]  Phone: [phone]  Fax: (   )    -  Follow Up Time:     Hang Palacio)  Internal Medicine  [address] [place] [address]  Phone: [phone]  Fax: [phone]  Follow Up Time:

## 2022-05-04 NOTE — DISCHARGE NOTE NURSING/CASE MANAGEMENT/SOCIAL WORK - BRAND OF COVID-19 VACCINATION
Pfizer dose 1, 2, and 3 Pt doesn't know exact dates vaccinations were done a year ago and booster 2 months/Pfizer dose 1, 2, and 3

## 2022-05-04 NOTE — PROGRESS NOTE ADULT - SUBJECTIVE AND OBJECTIVE BOX
INTERVAL HPI/OVERNIGHT EVENTS:  Patient S&E at bedside. Last night, pt unable to tolerate MRI brain due to claustrophobia. States that his breathing is improved today, remains on 2L NC. Was able to shower yesterday and feels better from that. VSS. Labs reviewed. WBC 12k today and trending down.    PAST MEDICAL & SURGICAL HISTORY:  Prostate CA    History of total bilateral knee replacement (TKR)        FAMILY HISTORY:  Family history of thyroid cancer (Mother)    Family history of lung cancer (Father)  had mesothelioma,   in his 90s        VITAL SIGNS:  T(F): 98.3 (22 @ 07:39)  HR: 81 (22 @ 07:39)  BP: 133/90 (22 @ 07:39)  RR: 18 (22 @ 07:39)  SpO2: 94% (22 @ 07:39)  Wt(kg): --    PHYSICAL EXAM:  Constitutional: NAD  Eyes: EOMI,  Respiratory:rhonchi, on 2L NC  Cardiovascular: RRR,  Gastrointestinal: soft, NTND,   Extremities: no c/c/e  Neurological: AAOx3      MEDICATIONS  (STANDING):  azithromycin   Tablet 500 milliGRAM(s) Oral daily  cefTRIAXone   IVPB 1000 milliGRAM(s) IV Intermittent every 24 hours  enoxaparin Injectable 40 milliGRAM(s) SubCutaneous every 12 hours  LORazepam     Tablet 1 milliGRAM(s) Oral once    MEDICATIONS  (PRN):      Allergies    tetracyclines (Unknown)    Intolerances        LABS:                        14.2   12.29 )-----------( 151      ( 03 May 2022 08:22 )             43.3         136  |  104  |  11  ----------------------------<  114<H>  4.1   |  29  |  0.85    Ca    8.4<L>      03 May 2022 08:22    TPro  7.7  /  Alb  2.4<L>  /  TBili  0.5  /  DBili  x   /  AST  43<H>  /  ALT  66  /  AlkPhos  66  05-03      Urinalysis Basic - ( 03 May 2022 02:07 )    Color: Laurita / Appearance: Slightly Turbid / S.025 / pH: x  Gluc: x / Ketone: Negative  / Bili: Negative / Urobili: Negative   Blood: x / Protein: 30 mg/dL / Nitrite: Negative   Leuk Esterase: Trace / RBC: 3-5 /HPF / WBC 3-5   Sq Epi: x / Non Sq Epi: Negative / Bacteria: Few        RADIOLOGY & ADDITIONAL TESTS:  Studies reviewed.

## 2022-05-04 NOTE — DISCHARGE NOTE NURSING/CASE MANAGEMENT/SOCIAL WORK - NSDCVIVACCINE_GEN_ALL_CORE_FT
Tdap; 02-Jul-2021 20:23; Cinthia Azul (RN); [employer]; [id number] (Exp. Date: 18-Oct-2022); IntraMuscular; Deltoid Right.; 0.5 milliLiter(s); VIS (VIS Published: 09-May-2013, VIS Presented: 02-Jul-2021);

## 2022-05-04 NOTE — PROGRESS NOTE ADULT - SUBJECTIVE AND OBJECTIVE BOX
Subjective:  Pt seen, on 2L NC. S/p Rt supraclav node biopsy yesterday.    Vital Signs:  Vital Signs Last 24 Hrs  T(C): 36.8 (05-04-22 @ 07:39), Max: 36.9 (05-03-22 @ 23:10)  T(F): 98.3 (05-04-22 @ 07:39), Max: 98.4 (05-03-22 @ 23:10)  HR: 81 (05-04-22 @ 07:39) (75 - 96)  BP: 133/90 (05-04-22 @ 07:39) (113/72 - 133/90)  RR: 18 (05-04-22 @ 07:39) (18 - 18)  SpO2: 94% (05-04-22 @ 07:39) (93% - 95%) on (O2)    Telemetry/Alarms:    Relevant labs, radiology and Medications reviewed                        14.2   12.29 )-----------( 151      ( 03 May 2022 08:22 )             43.3     05-03    136  |  104  |  11  ----------------------------<  114<H>  4.1   |  29  |  0.85    Ca    8.4<L>      03 May 2022 08:22    TPro  7.7  /  Alb  2.4<L>  /  TBili  0.5  /  DBili  x   /  AST  43<H>  /  ALT  66  /  AlkPhos  66  05-03      MEDICATIONS  (STANDING):  azithromycin   Tablet 500 milliGRAM(s) Oral daily  cefTRIAXone   IVPB 1000 milliGRAM(s) IV Intermittent every 24 hours  enoxaparin Injectable 40 milliGRAM(s) SubCutaneous every 12 hours  LORazepam     Tablet 1 milliGRAM(s) Oral once    MEDICATIONS  (PRN):      Physical exam  General:  NAD                                                         Neuro: A+O x 3, non-focal, speech clear and intact                     Eyes: PERRL, EOMI   ENT: Normal exam of nasal/oral mucosa with absence of cyanosis.   Neck: supple, no JVD, trachea midline   Chest: equal bilaterally, , normal excursion, no accessory muscle use note  CV: RRR,  GI: soft, NT, ND, obese  Extremities: HAZEL x 4, no C/C/E, distal motor/neuro/circ intact  I&O's Summary      Assessment  62y Male  w/ PAST MEDICAL & SURGICAL HISTORY:  Prostate CA    History of total bilateral knee replacement (TKR)    admitted with complaints of Patient is a 62y old  Male who presents with a chief complaint of SOB (04 May 2022 10:24)  .    62y Male PMHx of prostate cancer treated w RT 7 years ago, b/l knee replacement admitted w c/o SOB dx w PNA on 4/30/22. CT findings suspicious for underlying possible malignancy. Called to consult.   S/p IR biopsy of supraclavicular LN 5/3/22    cont treatment for PNA as per ID  f/u pathology from IR biopsy  outpt PET scan  If more tissue needed, will arrange for FB and EBUS  care as per medical/pulmonary/oncology services    Discussed with Cardiothoracic Team at AM rounds.

## 2022-05-05 VITALS
OXYGEN SATURATION: 93 % | HEART RATE: 87 BPM | SYSTOLIC BLOOD PRESSURE: 134 MMHG | DIASTOLIC BLOOD PRESSURE: 77 MMHG | RESPIRATION RATE: 18 BRPM | TEMPERATURE: 98 F

## 2022-05-05 LAB
ALBUMIN SERPL ELPH-MCNC: 2.8 G/DL — LOW (ref 3.3–5)
ALP SERPL-CCNC: 68 U/L — SIGNIFICANT CHANGE UP (ref 40–120)
ALT FLD-CCNC: 76 U/L — SIGNIFICANT CHANGE UP (ref 12–78)
ANION GAP SERPL CALC-SCNC: 5 MMOL/L — SIGNIFICANT CHANGE UP (ref 5–17)
AST SERPL-CCNC: 51 U/L — HIGH (ref 15–37)
BILIRUB SERPL-MCNC: 0.5 MG/DL — SIGNIFICANT CHANGE UP (ref 0.2–1.2)
BUN SERPL-MCNC: 11 MG/DL — SIGNIFICANT CHANGE UP (ref 7–23)
CALCIUM SERPL-MCNC: 9 MG/DL — SIGNIFICANT CHANGE UP (ref 8.5–10.1)
CHLORIDE SERPL-SCNC: 106 MMOL/L — SIGNIFICANT CHANGE UP (ref 96–108)
CO2 SERPL-SCNC: 26 MMOL/L — SIGNIFICANT CHANGE UP (ref 22–31)
CREAT SERPL-MCNC: 0.94 MG/DL — SIGNIFICANT CHANGE UP (ref 0.5–1.3)
CULTURE RESULTS: SIGNIFICANT CHANGE UP
CULTURE RESULTS: SIGNIFICANT CHANGE UP
EGFR: 91 ML/MIN/1.73M2 — SIGNIFICANT CHANGE UP
GLUCOSE SERPL-MCNC: 110 MG/DL — HIGH (ref 70–99)
HCT VFR BLD CALC: 45.5 % — SIGNIFICANT CHANGE UP (ref 39–50)
HGB BLD-MCNC: 14.8 G/DL — SIGNIFICANT CHANGE UP (ref 13–17)
MCHC RBC-ENTMCNC: 32 PG — SIGNIFICANT CHANGE UP (ref 27–34)
MCHC RBC-ENTMCNC: 32.5 GM/DL — SIGNIFICANT CHANGE UP (ref 32–36)
MCV RBC AUTO: 98.3 FL — SIGNIFICANT CHANGE UP (ref 80–100)
PLATELET # BLD AUTO: 129 K/UL — LOW (ref 150–400)
POTASSIUM SERPL-MCNC: 4.7 MMOL/L — SIGNIFICANT CHANGE UP (ref 3.5–5.3)
POTASSIUM SERPL-SCNC: 4.7 MMOL/L — SIGNIFICANT CHANGE UP (ref 3.5–5.3)
PROT SERPL-MCNC: 8.4 GM/DL — HIGH (ref 6–8.3)
RBC # BLD: 4.63 M/UL — SIGNIFICANT CHANGE UP (ref 4.2–5.8)
RBC # FLD: 14.3 % — SIGNIFICANT CHANGE UP (ref 10.3–14.5)
SODIUM SERPL-SCNC: 137 MMOL/L — SIGNIFICANT CHANGE UP (ref 135–145)
SPECIMEN SOURCE: SIGNIFICANT CHANGE UP
SPECIMEN SOURCE: SIGNIFICANT CHANGE UP
SURGICAL PATHOLOGY STUDY: SIGNIFICANT CHANGE UP
WBC # BLD: 9.83 K/UL — SIGNIFICANT CHANGE UP (ref 3.8–10.5)
WBC # FLD AUTO: 9.83 K/UL — SIGNIFICANT CHANGE UP (ref 3.8–10.5)

## 2022-05-05 PROCEDURE — 99231 SBSQ HOSP IP/OBS SF/LOW 25: CPT

## 2022-05-05 PROCEDURE — 99239 HOSP IP/OBS DSCHRG MGMT >30: CPT | Mod: GC

## 2022-05-05 RX ORDER — CEFUROXIME AXETIL 250 MG
1 TABLET ORAL
Qty: 10 | Refills: 0
Start: 2022-05-05 | End: 2022-05-09

## 2022-05-05 RX ORDER — CEFUROXIME AXETIL 250 MG
500 TABLET ORAL EVERY 12 HOURS
Refills: 0 | Status: DISCONTINUED | OUTPATIENT
Start: 2022-05-05 | End: 2022-05-05

## 2022-05-05 RX ADMIN — ENOXAPARIN SODIUM 40 MILLIGRAM(S): 100 INJECTION SUBCUTANEOUS at 10:48

## 2022-05-05 RX ADMIN — AZITHROMYCIN 500 MILLIGRAM(S): 500 TABLET, FILM COATED ORAL at 10:48

## 2022-05-05 NOTE — PROGRESS NOTE ADULT - SUBJECTIVE AND OBJECTIVE BOX
cc - dyspnea, cough   61yo male with PMHx of prostate cancer, b/l knee replacement here complaining of SOB and chest pain that started on the weekend.   Patient mentions his chest pain is non radiating but it is associated with WILEY with minimal movements and SOB all the time. If he tries to take deep breaths he gets chest pain and worsen SOB.  Patient mentions when he started he also had 100.5 fever and has been having productive cough at the beginning was green but for the past couple of days has been clear.  He has never experienced something similar.    Patient was recently seen in the ED on 4/26 after losing his balance and falling backwards.  He did hit his head at that time, but did not lose consciousness.  Patient denies any dizziness, lightheadedness, headache, nausea, vomiting.   On further questioning,  pt admitted to loss of appetite over the last eight days.  He mentioned WILEY ongoing for more than six months.   He was treated for Prostate cancer 7 years ago, has not had any recent follow up.    b/l knee replacement on 2004 and 2007.    In the ED patient was found to have WBC 20.35, D-dimer 2456, Lactate 1.4, he was givne Azithromycin 500mg x1 and Rocephine 1000mg x1    Subjective - Pt was seen by his bedside, not in acute distress. Pt has no complaints   REVIEW OF SYSTEMS:    REVIEW OF SYSTEMS:    CONSTITUTIONAL: No weakness, fevers or chills  EYES/ENT: No visual changes;  No vertigo or throat pain   NECK: No pain or stiffness  RESPIRATORY: No cough, wheezing, hemoptysis; No shortness of breath  CARDIOVASCULAR: No chest pain or palpitations  GASTROINTESTINAL: No abdominal or epigastric pain. No nausea, vomiting, or hematemesis; No diarrhea or constipation. No melena or hematochezia.  GENITOURINARY: No dysuria, frequency or hematuria  NEUROLOGICAL: No numbness or weakness  SKIN: No itching, rashes    Examination-    GENERAL: NAD, lying in bed comfortably  HEAD:  Atraumatic, Normocephalic  EYES: EOMI, PERRLA, conjunctiva and sclera clear  ENT: Moist mucous membranes  NECK: Supple, No JVD  CHEST/LUNG: Clear to auscultation bilaterally; No rales, rhonchi, wheezing, or rubs. Unlabored respirations  HEART: Regular rate and rhythm; No murmurs, rubs, or gallops  ABDOMEN: Bowel sounds present; Soft, Nontender, Nondistended. No hepatomegally  EXTREMITIES:  2+ Peripheral Pulses, brisk capillary refill. No clubbing, cyanosis, or edema  NERVOUS SYSTEM:  Alert & Oriented X3, speech clear. No deficits   MSK: FROM all 4 extremities, full and equal strength  SKIN: No rashes or lesions    Vital Signs Last 24 Hrs  T(C): 36.8 (05 May 2022 15:00), Max: 36.9 (04 May 2022 23:30)  T(F): 98.3 (05 May 2022 15:00), Max: 98.5 (04 May 2022 23:30)  HR: 87 (05 May 2022 15:00) (79 - 87)  BP: 134/77 (05 May 2022 15:00) (132/74 - 142/75)  BP(mean): --  RR: 18 (05 May 2022 15:00) (18 - 18)  SpO2: 93% (05 May 2022 15:00) (92% - 93%)   Labs  -                                     14.8   9.83  )-----------( 129      ( 05 May 2022 08:59 )             45.5   05-05    137  |  106  |  11  ----------------------------<  110<H>  4.7   |  26  |  0.94    Ca    9.0      05 May 2022 08:59    TPro  8.4<H>  /  Alb  2.8<L>  /  TBili  0.5  /  DBili  x   /  AST  51<H>  /  ALT  76  /  AlkPhos  68  05-05                 MEDICATIONS  (STANDING):  azithromycin   Tablet 500 milliGRAM(s) Oral daily  cefTRIAXone   IVPB 1000 milliGRAM(s) IV Intermittent every 24 hours  enoxaparin Injectable 40 milliGRAM(s) SubCutaneous every 12 hours    MEDICATIONS  (PRN):        < from: CT Angio Chest PE Protocol w/ IV Cont (04.30.22 @ 18:43) >  IMPRESSION: No pulmonary arterial emboli.    1.7 cm right lower lobe pulmonary nodule associated with right hilar and   mediastinal lymphadenopathy worrisome for primary lung neoplasm with   metastatic disease.    Multiple right supraclavicular and retroclavicular lymph nodes may also   represent metastatic disease as described above. These lymph nodes are   amenable to percutaneous ultrasound-guided biopsy.    Nonspecific 5 mm right upper lobe pulmonary nodule.    A few mildly enlarged portacaval lymph nodes are nonspecific.    PET scan can be performed for complete evaluation.    --- End of Report ---    < end of copied text >

## 2022-05-05 NOTE — PROGRESS NOTE ADULT - SUBJECTIVE AND OBJECTIVE BOX
Subjective:  Pt in chair NAD.  s/p Supraclavicular IR biopsy     T(C): 36.7 (05-05-22 @ 07:44), Max: 36.9 (05-04-22 @ 15:48)  HR: 79 (05-05-22 @ 07:44) (79 - 83)  BP: 132/74 (05-05-22 @ 07:44) (125/79 - 142/75)  ABP: --  ABP(mean): --  RR: 18 (05-05-22 @ 07:44) (18 - 18)  SpO2: 92% (05-05-22 @ 07:44) (92% - 95%) RA   Wt(kg): --  CVP(mm Hg): --  CO: --  CI: --  PA: --                                              Tele: SR             05-05    137  |  106  |  11  ----------------------------<  110<H>  4.7   |  26  |  0.94    Ca    9.0      05 May 2022 08:59    TPro  8.4<H>  /  Alb  2.8<L>  /  TBili  0.5  /  DBili  x   /  AST  51<H>  /  ALT  76  /  AlkPhos  68  05-05                               14.8   9.83  )-----------( 129      ( 05 May 2022 08:59 )             45.5                 CAPILLARY BLOOD GLUCOSE          < from: CT Angio Chest PE Protocol w/ IV Cont (04.30.22 @ 18:43) >    Evaluation of the lungs demonstrate emphysema. Mild bilateral lower lung   areas of linear or subsegmental atelectasis, left greater than right.    Dominant 1.7 cm right lower lobe pulmonary nodule on image 86 of series 2.  5 mm right upper lobe pulmonary nodule on image 30 of series 2.    Trace secretions within the trachea extending into the right mainstem   bronchus.    Degenerative changes of the spine. Mild superior endplate loss of height   of the L1 vertebral body.    IMPRESSION: No pulmonary arterial emboli.    1.7 cm right lower lobe pulmonary nodule associated with right hilar and   mediastinal lymphadenopathy worrisome for primary lung neoplasm with   metastatic disease.    Multiple right supraclavicular and retroclavicular lymph nodes may also   represent metastatic disease as described above. These lymph nodes are   amenable to percutaneous ultrasound-guided biopsy.    Nonspecific 5 mm right upper lobe pulmonary nodule.    A few mildly enlarged portacaval lymph nodes are nonspecific.    PET scan can be performed for complete evaluation.    < end of copied text >               exam   Neuro:  Alert Awake NAD   Pulm: decreased at bases   Rt SCL: dressing c/d/i   CV: RRR S1 S2   Abd: soft NT   Extremities: warm          Assessment:  62yMale    with PAST MEDICAL & SURGICAL HISTORY:  Prostate CA    History of total bilateral knee replacement (TKR)          Plan:

## 2022-05-05 NOTE — PROGRESS NOTE ADULT - SUBJECTIVE AND OBJECTIVE BOX
Date of service: 05-05-22 @ 11:15    Patient sitting in chair; is not on oxygen; no specific complaints        ROS: no fever or chills; denies dizziness, no HA, no SOB or cough, no abdominal pain, no diarrhea or constipation; no dysuria, no urinary frequency, no legs pain, no rashes    MEDICATIONS  (STANDING):  azithromycin   Tablet 500 milliGRAM(s) Oral daily  enoxaparin Injectable 40 milliGRAM(s) SubCutaneous every 12 hours  LORazepam     Tablet 1 milliGRAM(s) Oral once    MEDICATIONS  (PRN):      Vital Signs Last 24 Hrs  T(C): 36.7 (05 May 2022 07:44), Max: 36.9 (04 May 2022 15:48)  T(F): 98 (05 May 2022 07:44), Max: 98.5 (04 May 2022 15:48)  HR: 79 (05 May 2022 07:44) (79 - 83)  BP: 132/74 (05 May 2022 07:44) (125/79 - 142/75)  BP(mean): --  RR: 18 (05 May 2022 07:44) (18 - 18)  SpO2: 92% (05 May 2022 07:44) (92% - 95%)        Physical Exam:        Constitutional: frail looking  HEENT: NC/AT, EOMI, PERRLA, conjunctivae clear; ears and nose atraumatic; pharynx clear  Neck: supple; thyroid not palpable  Back: no tenderness  Respiratory: respiratory effort normal; clear to auscultation  Cardiovascular: S1S2 regular, no murmurs  Abdomen: soft, not tender, not distended, positive BS; no liver or spleen organomegaly  Genitourinary: no suprapubic tenderness  Musculoskeletal: no muscle tenderness, no joint swelling or tenderness  Neurological/ Psychiatric: AxOx3, judgement and insight normal;  moving all extremities  Skin: no rashes; no palpable lesions    Labs: all available labs reviewed             Labs:                          Labs:                        Labs:                        14.8   9.83  )-----------( 129      ( 05 May 2022 08:59 )             45.5     05-05    137  |  106  |  11  ----------------------------<  110<H>  4.7   |  26  |  0.94    Ca    9.0      05 May 2022 08:59    TPro  8.4<H>  /  Alb  2.8<L>  /  TBili  0.5  /  DBili  x   /  AST  51<H>  /  ALT  76  /  AlkPhos  68  05-05           Cultures:       Culture - Blood (collected 04-30-22 @ 14:42)  Source: .Blood None  Preliminary Report (05-01-22 @ 19:01):    No growth to date.    Culture - Blood (collected 04-30-22 @ 14:27)  Source: .Blood Blood-Peripheral  Preliminary Report (05-01-22 @ 19:01):    No growth to date.      D-Dimer Assay, Quantitative: 2456 ng/mL DDU (04-30-22 @ 14:42)        < from: CT Angio Chest PE Protocol w/ IV Cont (04.30.22 @ 18:43) >    ACC: [id number] EXAM:  CT ANGIO CHEST PULM ART [facility]                          PROCEDURE DATE:  04/30/2022          INTERPRETATION:  Clinical indication: Shortness of breath, right lower   lobe infiltrate.    CT angiogram of the chest was performed following intravenous   administration of 90 cc of Omnipaque-350. 10 cc of contrast was   discarded. MIP images are submitted.    No prior chest CTs are available for comparison.    No pulmonary arterial emboli.    No enlarged axillary lymph nodes. A few right supraclavicular or   retroclavicular lymph nodes with the largest mildly enlarged lymph node   measuring up to about 1.5 cm on image 10 of series 2.    Multiple mediastinal small and enlarged lymph nodes with the largest in   the subcarinal location measuring about 1.8 cm x 3.3 cm as measured on   the axial images. Ill-defined right hilar hypodense opacity surrounding   the right lower lung bronchovascular bundles, part of which measures   about 4.1 x 5 cm also likely represent lymphadenopathy. Smaller left   hilar lymph node is noted. Small and mildly enlarged lymph node adjacent   to the lower portion of the esophagus.    Evaluation of the upper abdomen demonstrate cholelithiasis. A few mildly   enlarged lymph node in the portacaval location largest measuring about   1.3 x 2.1 cm.    Trace bilateral pleural effusions, left greater than right.    Evaluation of the lungs demonstrate emphysema. Mild bilateral lower lung   areas of linear or subsegmental atelectasis, left greater than right.    Dominant 1.7 cm right lower lobe pulmonary nodule on image 86 of series 2.  5 mm right upper lobe pulmonary nodule on image 30 of series 2.    Trace secretions within the trachea extending into the right mainstem   bronchus.    Degenerative changes of the spine. Mild superior endplate loss of height   of the L1 vertebral body.    IMPRESSION: No pulmonary arterial emboli.    1.7 cm right lower lobe pulmonary nodule associated with right hilar and   mediastinal lymphadenopathy worrisome for primary lung neoplasm with   metastatic disease.    Multiple right supraclavicular and retroclavicular lymph nodes may also   represent metastatic disease as described above. These lymph nodes are   amenable to percutaneous ultrasound-guided biopsy.    Nonspecific 5 mm right upper lobe pulmonary nodule.    A few mildly enlarged portacaval lymph nodes are nonspecific.    PET scan can be performed for complete evaluation.    < end of copied text >              < end of copied text >            Radiology: all available radiological tests reviewed    Advanced directives addressed: full resuscitation

## 2022-05-05 NOTE — PROGRESS NOTE ADULT - SUBJECTIVE AND OBJECTIVE BOX
Subjective:  desatted with ambulation yesterday  this morning on room air at rest and feels breathing is less labored    Review of Systems:  All 10 systems reviewed in detailed and found to be negative with the exception of what has already been described above    Allergies:  tetracyclines (Unknown)    Meds  MEDICATIONS  (STANDING):  azithromycin   Tablet 500 milliGRAM(s) Oral daily  enoxaparin Injectable 40 milliGRAM(s) SubCutaneous every 12 hours  LORazepam     Tablet 1 milliGRAM(s) Oral once    MEDICATIONS  (PRN):    Physical Exam  T(C): 36.7 (05-05-22 @ 07:44), Max: 36.9 (05-04-22 @ 15:48)  HR: 79 (05-05-22 @ 07:44) (79 - 83)  BP: 132/74 (05-05-22 @ 07:44) (125/79 - 142/75)  RR: 18 (05-05-22 @ 07:44) (18 - 18)  SpO2: 92% (05-05-22 @ 07:44) (92% - 95%)  Gen: Alert, oriented, no distress  HEENT: Anicteric sclera, moist mucous membranes, no JVD, no lymphadenopathy  Cardio: Regular rhythm and rate, normal S1S2, no murmurs  Resp: Clear to auscultation bilaterally, no wheezing or rhonchi  GI: Nontender, nondistended, normoactive bowel sounds  Ext: No cyanosis, clubbing or edema  Neuro: Nonfocal    Labs:                        14.8   9.83  )-----------( 129      ( 05 May 2022 08:59 )             45.5     05-05    137  |  106  |  11  ----------------------------<  110<H>  4.7   |  26  |  0.94    Ca    9.0      05 May 2022 08:59    TPro  8.4<H>  /  Alb  2.8<L>  /  TBili  0.5  /  DBili  x   /  AST  51<H>  /  ALT  76  /  AlkPhos  68  05-05      CT Scan: personally reviewed  < from: CT Abdomen and Pelvis w/ IV Cont (05.02.22 @ 09:16) >  PROCEDURE DATE:  05/02/2022          INTERPRETATION:  CLINICAL INFORMATION: Metastatic lung disease, prostate   cancer    COMPARISON: 4/30/2022    CONTRAST/COMPLICATIONS:  IV Contrast: Omnipaque 350  90 cc administered   10 cc discarded  Oral Contrast: NONE  Complications: None reported at time of study completion    PROCEDURE:  CT of the Abdomen and Pelvis was performed.  Sagittal and coronal reformats were performed.    FINDINGS:  LOWER CHEST: Masslike opacity surrounding the inferior right hilum and   right lower lobe nodule. Trace left pleural effusion with associated   atelectasis    LIVER: Within normal limits.  BILE DUCTS: Normal caliber.  GALLBLADDER: Cholelithiasis.  SPLEEN: Mildly enlarged.  PANCREAS: Within normal limits.  ADRENALS: Within normal limits.  KIDNEYS/URETERS: Subcentimeter hypodensity in the midpole of the right   kidney with an associated punctate calcification. No hydronephrosis.    BLADDER:Within normal limits.  REPRODUCTIVE ORGANS: Prostatectomy.    BOWEL: No bowel obstruction. Mildly thickened fluid-filled, distal small   bowel loops. Appendix is normal. Colonic diverticulosis. Large amount   stool within the rectum.  PERITONEUM: No ascites.  VESSELS: Atherosclerotic changes.  RETROPERITONEUM/LYMPH NODES: No lymphadenopathy.  ABDOMINAL WALL: Small fat-containing left inguinal hernia. Partially   visualized fatty mass within the right hip adductor musculature.  BONES: Mild compression deformity of L1 of indeterminate age. Old right   inferior rib fractures    IMPRESSION:  Mildly thickened fluid-filled distal small bowel loops suggestive of   enteritis. No bowel obstruction.    Multiple lung findings, better visualized on recent CT of the chest.    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 However, correlate with prior outside   imaging.    Mild splenomegaly.    Additional findings as above.    < from: CT Angio Chest PE Protocol w/ IV Cont (04.30.22 @ 18:43) >  PROCEDURE DATE:  04/30/2022          INTERPRETATION:  Clinical indication: Shortness of breath, right lower   lobe infiltrate.    CT angiogram of the chest was performed following intravenous   administration of 90 cc of Omnipaque-350. 10 cc of contrast was   discarded. MIP images are submitted.    No prior chest CTs are available for comparison.    No pulmonary arterial emboli.    No enlarged axillary lymph nodes. A few right supraclavicular or   retroclavicular lymph nodes with the largest mildly enlarged lymph node   measuring up to about 1.5 cm on image 10 of series 2.    Multiple mediastinal small and enlarged lymph nodes with the largest in   the subcarinal location measuring about 1.8 cm x 3.3 cm as measured on   the axial images. Ill-defined right hilar hypodense opacity surrounding   the right lower lung bronchovascular bundles, part of which measures   about 4.1 x 5 cm also likely represent lymphadenopathy. Smaller left   hilar lymph node is noted. Small and mildly enlarged lymph node adjacent   to the lower portion of the esophagus.    Evaluation of the upper abdomen demonstrate cholelithiasis. A few mildly   enlarged lymph node in the portacaval location largest measuring about   1.3 x 2.1 cm.    Trace bilateral pleural effusions, left greater than right.    Evaluation of the lungs demonstrate emphysema. Mild bilateral lower lung   areas of linear or subsegmental atelectasis, left greater than right.    Dominant 1.7 cm right lower lobe pulmonary nodule on image 86 of series 2.  5 mm right upper lobe pulmonary nodule on image 30 of series 2.    Trace secretions within the trachea extending into the right mainstem   bronchus.    Degenerative changes of the spine. Mild superior endplate loss of height   of the L1 vertebral body.    IMPRESSION: No pulmonary arterial emboli.    1.7 cm right lower lobe pulmonary nodule associated with right hilar and   mediastinal lymphadenopathy worrisome for primary lung neoplasm with   metastatic disease.    Multiple right supraclavicular and retroclavicular lymph nodes may also   represent metastatic disease as described above. These lymph nodes are   amenable to percutaneous ultrasound-guided biopsy.    Nonspecific 5 mm right upper lobe pulmonary nodule.    A few mildly enlarged portacaval lymph nodes are nonspecific.    PET scan can be performed for complete evaluation.

## 2022-05-05 NOTE — PROGRESS NOTE ADULT - ASSESSMENT
61yo male with PMHx of prostate cancer, b/l knee replacement here complaining of SOB and chest discomfort that started on the weekend.    #AHRF and Sepsis 2/2 CAP  -Improving   -Pt met SIRS criteria on admission  -CT angio-No pulmonary arterial emboli.  - Continue O2 NC currently on 4 L, titrate PRN.  goal SpO2 >92%  - PSA WNL Hx of prostate CA   - Pro-BNP unremarkable. Low suspicion for RV strain. Holding off on TTE to check RV strain  - Continue Azithromycin 500mg qd  - Continue Rocephin 1000mg qd  - Monitor vitals closely    #Metastatic lung disease  -Ct angio- No pulmonary arterial emboli.1.7 cm right lower lobe pulmonary nodule associated with right hilar and   mediastinal lymphadenopathy worrisome for primary lung neoplasm with metastatic disease. Multiple right supraclavicular and retroclavicular lymph nodes may also   represent metastatic disease as described above. These lymph nodes area amenable to percutaneous ultrasound-guided biopsy.  Nonspecific 5 mm right upper lobe pulmonary nodule.  A few mildly enlarged portacaval lymph nodes are nonspecific.  PET scan can be performed for complete evaluation.  -Pulmonology consult appreciated  -Hem/onc consult appreciated- Recommendations -may need EBUS with mediastinal and hilar LN sampling for staging purposes- will discuss with pulm                                                                             - other option inludes tissue diagnosis by biopsy of supraclavicular node by IR                                                                              - would recommend CT a/p with contrast for metastatic workup while inpatient                                                                              - CTH w/o contrast but negative- due to mediastinal LN concern and advanced staging, will need MRI brain with contrast  -CT abd and pelvis with contrast- Mild splenomegaly noted and signs colitis    -MRI brain with contrast ordered but pt refused as he is feeling claustrophobic inside the machine. Offered ativan but he refused.   -Pt might need pet scan, bronchoscopy and Mri of the brain as out patient      #HX of Prostate CA  - PSA WNL  -No urinary retention     #Chest pain  -Resolved   - Likely pleuritic, exacerbates with deep breaths  - Trops neg x1, will repeat in 6hr  - f/u CTA results to r/o PE  - EKG: Sinus tach  - Monitor vital signs      #Chronic pain  - Patient mentions he takes Hydrocodone as outpatient, does not remember the dose  - Currently not having knee or shoulder pain.    #DVT ppx  -Lovenox    #Code status  - Full code  - Case was discussed with Dr. Wallace   
61yo male with PMHx of prostate cancer, b/l knee replacement here complaining of SOB and chest discomfort that started on the weekend.    #AHRF and Sepsis 2/2 CAP  -Pt met SIRS criteria on admission  -CT angio-No pulmonary arterial emboli.  - Continue O2 NC currently on 4 L, titrate PRN.  goal SpO2 >92%  - f/u PSA Hx of prostate Ca 7 yrs ago, no recent follow up  - Pro-BNP unremarkable. Low suspicion for RV strain. Holding off on TTE to check RV strain  - Continue Azithromycin 500mg qd  - Continue Rocephin 1000mg qd  - Monitor vitals closely    #Metastatic lung disease  -Ct angio- No pulmonary arterial emboli.1.7 cm right lower lobe pulmonary nodule associated with right hilar and   mediastinal lymphadenopathy worrisome for primary lung neoplasm with metastatic disease. Multiple right supraclavicular and retroclavicular lymph nodes may also   represent metastatic disease as described above. These lymph nodes area amenable to percutaneous ultrasound-guided biopsy.  Nonspecific 5 mm right upper lobe pulmonary nodule.  A few mildly enlarged portacaval lymph nodes are nonspecific.  PET scan can be performed for complete evaluation.  -Pulmonology consult appreciated  -Hem/onc consult appreciated- Recommendations -may need EBUS with mediastinal and hilar LN sampling for staging purposes- will discuss with pulm                                                                             - other option inludes tissue diagnosis by biopsy of supraclavicular node by IR                                                                              - would recommend CT a/p with contrast for metastatic workup while inpatient                                                                              - CTH w/o contrast but negative- due to mediastinal LN concern and advanced staging, will need MRI brain with contrast  -CT abd and pelvis with contrast ordered    -MRI brain with contrast will be done tomorrow.    #Chest pain  - Likely pleuritic, exacerbates with deep breaths  - Trops neg x1, will repeat in 6hr  - f/u CTA results to r/o PE  - EKG: Sinus tach  - Monitor vital signs  - f/u AM labs    #Chronic pain  - Patient mentions he takes Hydrocodone as outpatient, does not remember the dose  - Currently not having knee or shoulder pain.    #DVT ppx  -Lovenox    #Code status  - Full code  - Case was discussed with Dr. Wallace   
62y old Male with PMHx of prostate cancer, b/l knee replacement with SOB with hilar mass and pneumonia  1. Pneumonia, hilar mass, lymphadenopathy: At this time it is difficult to determine if these findings suggest pneumonia or malignancy. With antibiotic treatment he may have some of these lesions improve/resolve. I do suspect that he would have residual findings after treatment of his pneumonia, which would likely represent malignancy. Given the fact that he is being treated with antibiotics, suggest repeat CT  or PET in 2-3 weeks and then re evaluation of which intrathoracic sites may be suggestive of malignancy.  since his supraclavicular biopsy suggest no malignancy, we have to have more definitive targets for ebus/ir biopsy after his pneumonia resolves  -continue ceftriaxone and azithromycin 
62y old Male with PMHx of prostate cancer, b/l knee replacement with SOB with hilar mass and pneumonia  1. Pneumonia, hilar mass, lymphadenopathy: At this time it is difficult to determine if these findings suggest pneumonia or malignancy. With antibiotic treatment he may have some of these lesions improve/resolve. I do suspect that he would have residual findings after treatment of his pneumonia, which would likely represent malignancy. Given the fact that he is being treated with antibiotics, suggest repeat CT  or PET in 2-3 weeks and then re evaluation of which intrathoracic sites may be suggestive of malignancy.  since his supraclavicular biopsy suggest no malignancy, we have to have more definitive targets for ebus/ir biopsy after his pneumonia resolves  -continue ceftriaxone and azithromycin   -recheck oxygen with ambulation
62y old Male with PMHx of prostate cancer, b/l knee replacement with SOB with hilar mass and pneumonia  1. Pneumonia, hilar mass, lymphadenopathy: At this time it is difficult to determine if these findings suggest pneumonia or malignancy. With antibiotic treatment he may have some of these lesions improve/resolve. I do suspect that he would have residual findings after treatment of his pneumonia, which would likely represent malignancy. Given the fact that he is being treated with antibiotics, suggest repeat CT in 2-3 weeks and then re evaluation of which intrathoracic sites may be suggestive of malignancy. discussed with dr nieves, we can obtain a biopsy of the supraclavicular lymph node given its accessibility and less likelihood of reactivity in the setting of infection  -continue ceftriaxone and azithromycin     spoke to primary team and dr nieves
63yo male with PMHx of prostate cancer, b/l knee replacement admitted on 4/30 for evaluation of ongoing chest pain associated with shortness of breath, especially when taking deep breaths, associated with fever to 100.5, productive of cough initially green now clear, had recent evaluation in ED after fall backwards when he lost his balance. No recent follow up for the prostate cancer.     1. Patient admitted with pneumonia, possibly smoldering malignancy  - follow up cultures   - serial cbc and monitor temperature   - reviewed prior medical records to evaluate for resistant or atypical pathogens   - iv hydration and supportive care   - oxygen and nebs as needed   - day #4 zithromax and ceftriaxone  - tolerating antibiotics without rashes or side effects   - consideration for IR aspiration of the pulmonary nodes/mass like lesion, possibly after resolution of pneumonia?  2. other issues: per medicine
63yo male with PMHx of prostate cancer, b/l knee replacement admitted on 4/30 for evaluation of ongoing chest pain associated with shortness of breath, especially when taking deep breaths, associated with fever to 100.5, productive of cough initially green now clear, had recent evaluation in ED after fall backwards when he lost his balance. No recent follow up for the prostate cancer.     1. Patient admitted with pneumonia, possibly smoldering malignancy  - follow up cultures   - serial cbc and monitor temperature   - reviewed prior medical records to evaluate for resistant or atypical pathogens   - iv hydration and supportive care   - oxygen and nebs as needed   - day #5 zithromax and ceftriaxone  - tolerating antibiotics without rashes or side effects   - hopefully continues to improve, discharge planning in next 24 hours? on ceftin 500 mg po g 12 hours for total 10 days  2. other issues: per medicine
61yo male with PMHx of prostate cancer, b/l knee replacement here complaining of SOB and chest discomfort that started on the weekend.    #AHRF and Sepsis 2/2 CAP  -Improving   -Pt met SIRS criteria on admission  -CT angio-No pulmonary arterial emboli.  - Continue O2 NC currently on 4 L, titrate PRN.  goal SpO2 >92%  - PSA WNL Hx of prostate CA   - Pro-BNP unremarkable. Low suspicion for RV strain. Holding off on TTE to check RV strain  - Continue Azithromycin 500mg qd  - Continue Rocephin 1000mg qd  - Monitor vitals closely    #Metastatic lung disease  -Ct angio- No pulmonary arterial emboli.1.7 cm right lower lobe pulmonary nodule associated with right hilar and   mediastinal lymphadenopathy worrisome for primary lung neoplasm with metastatic disease. Multiple right supraclavicular and retroclavicular lymph nodes may also   represent metastatic disease as described above. These lymph nodes area amenable to percutaneous ultrasound-guided biopsy.  Nonspecific 5 mm right upper lobe pulmonary nodule.  A few mildly enlarged portacaval lymph nodes are nonspecific.  PET scan can be performed for complete evaluation.  -Pulmonology consult appreciated  -Hem/onc consult appreciated- Recommendations -may need EBUS with mediastinal and hilar LN sampling for staging purposes- will discuss with pulm                                                                             - other option inludes tissue diagnosis by biopsy of supraclavicular node by IR                                                                              - would recommend CT a/p with contrast for metastatic workup while inpatient                                                                              - CTH w/o contrast but negative- due to mediastinal LN concern and advanced staging, will need MRI brain with contrast  -CT abd and pelvis with contrast- Mild splenomegaly noted and signs colitis    -MRI brain with contrast ordered but pt refused as he is feeling claustrophobic inside the machine. Offered ativan but he refused.   -Pt might need pet scan, bronchoscopy and Mri of the brain as out patient      #HX of Prostate CA  - PSA WNL  -No urinary retention     #Chest pain  -Resolved   - Likely pleuritic, exacerbates with deep breaths  - Trops neg x1, will repeat in 6hr  - f/u CTA results to r/o PE  - EKG: Sinus tach  - Monitor vital signs      #Chronic pain  - Patient mentions he takes Hydrocodone as outpatient, does not remember the dose  - Currently not having knee or shoulder pain.    #DVT ppx  -Lovenox    #Code status  - Full code  -Pt is for discharge today. Has to take ceftin 500 mg po bid for 5 days. Pt has to follow-up with cardio thoracic surgery, pulmonology and Hem/onc for further management.   - Case was discussed with Dr. Bey      
62y Male PMHx of prostate cancer treated w RT 7 years ago, b/l knee replacement admitted w c/o SOB dx w PNA on 4/30/22. CT findings suspicious for underlying possible malignancy. Called to consult.   S/p IR biopsy of supraclavicular LN 5/3/22      Plan   cont treatment for PNA as per ID  f/u pathology from IR biopsy  outpt PET scan  If more tissue needed, will arrange for FB and EBUS  care as per medical/pulmonary/oncology services  plan for discharge today   Dr Salcido office information in discharge document     Discussed with Cardiothoracic Team at AM rounds.
63yo male with PMHx of prostate cancer, b/l knee replacement admitted on 4/30 for evaluation of ongoing chest pain associated with shortness of breath, especially when taking deep breaths, associated with fever to 100.5, productive of cough initially green now clear, had recent evaluation in ED after fall backwards when he lost his balance. No recent follow up for the prostate cancer.     1. Patient admitted with pneumonia, possibly smoldering malignancy  - follow up cultures   - serial cbc and monitor temperature   - reviewed prior medical records to evaluate for resistant or atypical pathogens   - iv hydration and supportive care   - oxygen and nebs as needed   - day #5 zithromax and ceftriaxone  - tolerating antibiotics without rashes or side effects   - will start  ceftin 500 mg po g 12 hours for total 10 days (5 more days)  2. other issues: per medicine
63yo male with PMHx of prostate cancer, b/l knee replacement here complaining of SOB and chest pain now found to have a 6x6.1x3.8 right hilar mass with enlarged mediastinal, hilar LN and tumor embolus w/in smaller branches in RUL.     # lung mass  - CT chest- 1.7 cm right lower lobe pulmonary nodule associated with right hilar and mediastinal lymphadenopathy larged in subcarinal location 1.8x3.3 cm and ill defined right hilar hypodense opacity part of which measures about 4.1x5 cm also likely representing malignancy, worrisome for primary lung neoplasm with metastatic disease. Multiple right supraclavicular and retroclavicular lymph nodes may also  represent metastatic disease as described above. These lymph nodes are amenable to percutaneous ultrasound-guided biopsy. Nonspecific 5 mm right upper lobe pulmonary nodule. A few mildly enlarged portacaval lymph nodes are nonspecific. Evaluation of the upper abdomen demonstrate cholelithiasis. A few mildly enlarged lymph node in the portacaval location largest measuring about 1.3 x 2.1 cm.  - CT a/p Mildly thickened fluid-filled distal small bowel loops suggestive of enteritis. No bowel obstruction. Partially visualized fatty mass within the right hip adductor musculature, likely a lipoma. Mild splenomegaly  - CTH w/o contrast but negative  - TTE- EF 65%  - CEA 0.9  - pulm consulted- discussed with Dr. Mosley  - 5/3, s/p biopsy of supraclavicular node by IR - will f/u pathology outpatient  - MRI brain with, w/o contrast pending- unable to tolerate due to claustrophobia- can attempt open MRI outpatient   - will need PET scan outpatient  - will need bronch outpatient and repeat CT chest in 3 weeks to evaluate for resolution vs persistence of lung findings  - all discussed with patient  - attempt to wean off O2, PT  - dispo planning    # tumor thrombus  - Definitive therapy for pulmonary tumor embolism is directed at treating the primary tumor. 2019 study showed that there is no significant difference in survival between patients with tumour thrombus treated with or without anticoagulation.   - pts typically do not require anticoagulation unless another indication  - NEGATIVE LE US doppler - no DVT    DISPO PLANNING      Dr. Hong Molina  cell: [phone]  Weekends and nights please call [phone] for MD on call  [facility]  Hematology/Oncology      
63yo male with PMHx of prostate cancer, b/l knee replacement here complaining of SOB and chest pain now found to have a 6x6.1x3.8 right hilar mass with enlarged mediastinal, hilar LN and tumor embolus w/in smaller branches in RUL.     # lung mass  - CT chest- 1.7 cm right lower lobe pulmonary nodule associated with right hilar and mediastinal lymphadenopathy larged in subcarinal location 1.8x3.3 cm and ill defined right hilar hypodense opacity part of which measures about 4.1x5 cm also likely representing malignancy, worrisome for primary lung neoplasm with metastatic disease. Multiple right supraclavicular and retroclavicular lymph nodes may also  represent metastatic disease as described above. These lymph nodes are amenable to percutaneous ultrasound-guided biopsy. Nonspecific 5 mm right upper lobe pulmonary nodule. A few mildly enlarged portacaval lymph nodes are nonspecific. Evaluation of the upper abdomen demonstrate cholelithiasis. A few mildly enlarged lymph node in the portacaval location largest measuring about 1.3 x 2.1 cm.  - CT a/p Mildly thickened fluid-filled distal small bowel loops suggestive of enteritis. No bowel obstruction. Partially visualized fatty mass within the right hip adductor musculature, likely a lipoma. Mild splenomegaly  - CTH w/o contrast but negative  - TTE- EF 65%  - CEA 0.9  - pulm consulted- discussed with Dr. Mosley  - would recommend tissue diagnosis by biopsy of supraclavicular node by IR   - MRI brain with, w/o contrast pending  - will need PET scan outpatient  - will need bronch outpatient and repeat CT chest in 3 weeks to evaluate for resolution vs persistence of lung findings    # tumor thrombus  - Definitive therapy for pulmonary tumor embolism is directed at treating the primary tumor. 2019 study showed that there is no significant difference in survival between patients with tumour thrombus treated with or without anticoagulation.   - pts typically do not require anticoagulation unless another indication  - NEGATIVE LE US doppler - no DVT   - no history of blood clot in past   - now off hep gtt      Dr. Hong Molina  cell: [phone]  Weekends and nights please call [phone] for MD on call  [facility]  Hematology/Oncology      
61yo male with PMHx of prostate cancer, b/l knee replacement here complaining of SOB and chest discomfort that started on the weekend.    #AHRF and Sepsis 2/2 CAP  -Pt met SIRS criteria on admission  -CT angio-No pulmonary arterial emboli.  - Continue O2 NC currently on 4 L, titrate PRN.  goal SpO2 >92%  - PSA WNL Hx of prostate CA   - Pro-BNP unremarkable. Low suspicion for RV strain. Holding off on TTE to check RV strain  - Continue Azithromycin 500mg qd  - Continue Rocephin 1000mg qd  - Monitor vitals closely    #Metastatic lung disease  -Ct angio- No pulmonary arterial emboli.1.7 cm right lower lobe pulmonary nodule associated with right hilar and   mediastinal lymphadenopathy worrisome for primary lung neoplasm with metastatic disease. Multiple right supraclavicular and retroclavicular lymph nodes may also   represent metastatic disease as described above. These lymph nodes area amenable to percutaneous ultrasound-guided biopsy.  Nonspecific 5 mm right upper lobe pulmonary nodule.  A few mildly enlarged portacaval lymph nodes are nonspecific.  PET scan can be performed for complete evaluation.  -Pulmonology consult appreciated  -Hem/onc consult appreciated- Recommendations -may need EBUS with mediastinal and hilar LN sampling for staging purposes- will discuss with pulm                                                                             - other option inludes tissue diagnosis by biopsy of supraclavicular node by IR                                                                              - would recommend CT a/p with contrast for metastatic workup while inpatient                                                                              - CTH w/o contrast but negative- due to mediastinal LN concern and advanced staging, will need MRI brain with contrast  -CT abd and pelvis with contrast- Mild splenomegaly noted and signs colitis    -MRI brain with contrast will be done tomorrow.    #HX of Prostate CA  - PSA WNL  -No urinary retention     #Chest pain  -Resolved   - Likely pleuritic, exacerbates with deep breaths  - Trops neg x1, will repeat in 6hr  - f/u CTA results to r/o PE  - EKG: Sinus tach  - Monitor vital signs  - f/u AM labs    #Chronic pain  - Patient mentions he takes Hydrocodone as outpatient, does not remember the dose  - Currently not having knee or shoulder pain.    #DVT ppx  -Lovenox    #Code status  - Full code  - Case was discussed with Dr. Wallace   
61yo male with PMHx of prostate cancer, b/l knee replacement here complaining of SOB and chest discomfort that started on the weekend.    Admit to tele     #AHRF 2/2 CAP  -CT angio-No pulmonary arterial emboli.  - Continue O2 NC currently on 4 L, titrate PRN.  goal SpO2 >92%  - f/u PSA Hx of prostate Ca 7 yrs ago, no recent follow up  - Pro-BNP unremarkable. Low suspicion for RV strain. Holding off on TTE to check RV strain  - Continue Azithromycin 500mg qd  - Continue Rocephin 1000mg qd  - Monitor vitals closely    #Metastatic lung disease  -Ct angio- No pulmonary arterial emboli.1.7 cm right lower lobe pulmonary nodule associated with right hilar and   mediastinal lymphadenopathy worrisome for primary lung neoplasm with metastatic disease. Multiple right supraclavicular and retroclavicular lymph nodes may also   represent metastatic disease as described above. These lymph nodes area amenable to percutaneous ultrasound-guided biopsy.  Nonspecific 5 mm right upper lobe pulmonary nodule.  A few mildly enlarged portacaval lymph nodes are nonspecific.  PET scan can be performed for complete evaluation.  -Pulmonology consult appreciated  -Hem/onc consult appreciated- Recommendations -may need EBUS with mediastinal and hilar LN sampling for staging purposes- will discuss with pulm                                                                             - other option inludes tissue diagnosis by biopsy of supraclavicular node by IR                                                                              - would recommend CT a/p with contrast for metastatic workup while inpatient                                                                              - CTH w/o contrast but negative- due to mediastinal LN concern and advanced staging, will need MRI brain with contrast     #Chest pain  - Likely pleuritic, exacerbates with deep breaths  - Trops neg x1, will repeat in 6hr  - f/u CTA results to r/o PE  - EKG: Sinus tach  - Monitor vital signs  - f/u AM labs    #Chronic pain  - Patient mentions he takes Hydrocodone as outpatient, does not remember the dose  - Currently not having knee or shoulder pain.    #DVT ppx  -Lovenox    #Code status  - Full code  - Case was discussed with Dr. Loera

## 2022-05-05 NOTE — PROGRESS NOTE ADULT - PROVIDER SPECIALTY LIST ADULT
Family Medicine
Heme/Onc
Family Medicine
Pulmonology
Pulmonology
Thoracic Surgery
Family Medicine
Family Medicine
Infectious Disease
Pulmonology
Thoracic Surgery
Family Medicine
Heme/Onc

## 2022-05-11 DIAGNOSIS — A41.9 SEPSIS, UNSPECIFIED ORGANISM: ICD-10-CM

## 2022-05-11 DIAGNOSIS — Z85.46 PERSONAL HISTORY OF MALIGNANT NEOPLASM OF PROSTATE: ICD-10-CM

## 2022-05-11 DIAGNOSIS — R91.8 OTHER NONSPECIFIC ABNORMAL FINDING OF LUNG FIELD: ICD-10-CM

## 2022-05-11 DIAGNOSIS — Z87.891 PERSONAL HISTORY OF NICOTINE DEPENDENCE: ICD-10-CM

## 2022-05-11 DIAGNOSIS — E83.51 HYPOCALCEMIA: ICD-10-CM

## 2022-05-11 DIAGNOSIS — D69.6 THROMBOCYTOPENIA, UNSPECIFIED: ICD-10-CM

## 2022-05-11 DIAGNOSIS — Z80.8 FAMILY HISTORY OF MALIGNANT NEOPLASM OF OTHER ORGANS OR SYSTEMS: ICD-10-CM

## 2022-05-11 DIAGNOSIS — G89.29 OTHER CHRONIC PAIN: ICD-10-CM

## 2022-05-11 DIAGNOSIS — Z80.1 FAMILY HISTORY OF MALIGNANT NEOPLASM OF TRACHEA, BRONCHUS AND LUNG: ICD-10-CM

## 2022-05-11 DIAGNOSIS — Z88.3 ALLERGY STATUS TO OTHER ANTI-INFECTIVE AGENTS: ICD-10-CM

## 2022-05-11 DIAGNOSIS — Z96.653 PRESENCE OF ARTIFICIAL KNEE JOINT, BILATERAL: ICD-10-CM

## 2022-05-11 DIAGNOSIS — J96.01 ACUTE RESPIRATORY FAILURE WITH HYPOXIA: ICD-10-CM

## 2022-05-11 DIAGNOSIS — K52.9 NONINFECTIVE GASTROENTERITIS AND COLITIS, UNSPECIFIED: ICD-10-CM

## 2022-05-11 DIAGNOSIS — R59.0 LOCALIZED ENLARGED LYMPH NODES: ICD-10-CM

## 2022-05-11 DIAGNOSIS — J18.9 PNEUMONIA, UNSPECIFIED ORGANISM: ICD-10-CM

## 2023-04-05 NOTE — ED ADULT NURSE NOTE - PAIN: PRESENCE, MLM
Addended by: JOSÉ LUIS SALGADO on: 4/5/2023 10:16 AM     Modules accepted: Orders    
complains of pain/discomfort

## 2023-05-10 NOTE — ED PROVIDER NOTE - EMPLOYMENT
Physical Therapy Visit    Visit Type: Daily Treatment Note  Visit: 2  Referring Provider: Anuel Mejia MD  Medical Diagnosis (from order): Diagnosis Information    Diagnosis  719.41, 338.29 (ICD-9-CM) - M25.511, G89.29 (ICD-10-CM) - Chronic right shoulder pain         SUBJECTIVE                                                                                                               Pt states he feels improved shoulder internal rotation with less pain during motion.       OBJECTIVE                                                                                                                                       Treatment     Therapeutic Exercise  Internal rotation stretch 3 x 30\"  pec stretch doorway 3 x 30\"  Sleeper stretch 10 x 10\" H   Resisted IR GTb 3 x 12   Prone W 2 x 12  Prone H ABD with IR 3#   Prone scap retract     Not Done  Pt edu-POC, prognosis, expectations for PT, eval findings, posture in seated position   HEP creation and discussion  Reviewed prior HEP- internal rotation with towel, pec stretch doorway, pendulums        Home Exercise Program  [id number]  medbridge tab      ASSESSMENT                                                                                                            Pt returns to PT with reported improvement in symptoms with functional IR.   Continued with posterior chain recruitment with prone H ABD with IR and prone scap retraction. Pt shows good tolerance with fatigue noted. Sleeper stretch introduced to encourage further functional IR AROM.        Therapy procedure time and total treatment time can be found documented on the Time Entry flowsheet    
N/A

## 2024-07-06 NOTE — ED ADULT NURSE NOTE - CHIEF COMPLAINT QUOTE
Pt presents to er with complaints of falling down 7 steps at 13:00 today, states he hit his back, back of his head and laceration to right elbow, pain to right shoulder/arm denies loc, use of blood thinners at this time, denies chest pain, sob, states he has a headache at this time, complains of chronic bilateral hip pain at this time, unknown if utd with tetanus shot.  TA called at 1818 done